# Patient Record
Sex: MALE | Race: WHITE | NOT HISPANIC OR LATINO | URBAN - METROPOLITAN AREA
[De-identification: names, ages, dates, MRNs, and addresses within clinical notes are randomized per-mention and may not be internally consistent; named-entity substitution may affect disease eponyms.]

---

## 2021-04-05 LAB
BILIRUB SERPL-MCNC: NEGATIVE MG/DL
BLOOD URINE, POC: NORMAL
CLARITY, POC UA: CLEAR
COLOR, POC UA: YELLOW
GLUCOSE UR QL STRIP: NORMAL
KETONES UR QL STRIP: NEGATIVE
LEUKOCYTE EST, POC UA: NEGATIVE
NITRITE, POC UA: NEGATIVE
PH, POC UA: 5.5
PROTEIN, POC: NORMAL
SARS-COV-2 RNA RESP QL NAA+PROBE: NEGATIVE
SPECIFIC GRAVITY, POC UA: 1.02
UROBILINOGEN, POC UA: NORMAL

## 2021-05-10 ENCOUNTER — HISTORICAL (OUTPATIENT)
Dept: INTERNAL MEDICINE | Facility: CLINIC | Age: 51
End: 2021-05-10

## 2021-05-10 LAB
ABS NEUT (OLG): 5.72 X10(3)/MCL (ref 2.1–9.2)
ALBUMIN SERPL-MCNC: 3.8 GM/DL (ref 3.5–5)
ALBUMIN/GLOB SERPL: 1 RATIO (ref 1.1–2)
ALP SERPL-CCNC: 73 UNIT/L (ref 40–150)
ALT SERPL-CCNC: 27 UNIT/L (ref 0–55)
APPEARANCE, UA: CLEAR
AST SERPL-CCNC: 20 UNIT/L (ref 5–34)
BACTERIA #/AREA URNS AUTO: ABNORMAL /HPF
BASOPHILS # BLD AUTO: 0 X10(3)/MCL (ref 0–0.2)
BASOPHILS NFR BLD AUTO: 0 %
BILIRUB SERPL-MCNC: 0.9 MG/DL
BILIRUB UR QL STRIP: NEGATIVE
BILIRUBIN DIRECT+TOT PNL SERPL-MCNC: 0.3 MG/DL (ref 0–0.5)
BILIRUBIN DIRECT+TOT PNL SERPL-MCNC: 0.6 MG/DL (ref 0–0.8)
BUN SERPL-MCNC: 11.9 MG/DL (ref 8.4–25.7)
CALCIUM SERPL-MCNC: 9.8 MG/DL (ref 8.4–10.2)
CHLORIDE SERPL-SCNC: 103 MMOL/L (ref 98–107)
CHOLEST SERPL-MCNC: 171 MG/DL
CHOLEST/HDLC SERPL: 4 {RATIO} (ref 0–5)
CO2 SERPL-SCNC: 25 MMOL/L (ref 22–29)
COLOR UR: NORMAL
CREAT SERPL-MCNC: 0.82 MG/DL (ref 0.73–1.18)
CREAT UR-MCNC: 105.5 MG/DL (ref 58–161)
EOSINOPHIL # BLD AUTO: 0.2 X10(3)/MCL (ref 0–0.9)
EOSINOPHIL NFR BLD AUTO: 3 %
ERYTHROCYTE [DISTWIDTH] IN BLOOD BY AUTOMATED COUNT: 13.5 % (ref 11.5–14.5)
EST. AVERAGE GLUCOSE BLD GHB EST-MCNC: 177.2 MG/DL
GLOBULIN SER-MCNC: 3.8 GM/DL (ref 2.4–3.5)
GLUCOSE (UA): NEGATIVE
GLUCOSE SERPL-MCNC: 185 MG/DL (ref 74–100)
HAV IGM SERPL QL IA: NONREACTIVE
HBA1C MFR BLD: 7.8 %
HBV CORE IGM SERPL QL IA: NONREACTIVE
HBV SURFACE AG SERPL QL IA: NONREACTIVE
HCT VFR BLD AUTO: 40.4 % (ref 40–51)
HCV AB SERPL QL IA: NONREACTIVE
HDLC SERPL-MCNC: 45 MG/DL (ref 35–60)
HGB BLD-MCNC: 13.5 GM/DL (ref 13.5–17.5)
HGB UR QL STRIP: NEGATIVE
HIV 1+2 AB+HIV1 P24 AG SERPL QL IA: NONREACTIVE
HYALINE CASTS #/AREA URNS LPF: ABNORMAL /LPF
IMM GRANULOCYTES # BLD AUTO: 0.01 10*3/UL
IMM GRANULOCYTES NFR BLD AUTO: 0 %
KETONES UR QL STRIP: NEGATIVE
LDLC SERPL CALC-MCNC: 90 MG/DL (ref 50–140)
LEUKOCYTE ESTERASE UR QL STRIP: NEGATIVE
LYMPHOCYTES # BLD AUTO: 2 X10(3)/MCL (ref 0.6–4.6)
LYMPHOCYTES NFR BLD AUTO: 22 %
MCH RBC QN AUTO: 30.6 PG (ref 26–34)
MCHC RBC AUTO-ENTMCNC: 33.4 GM/DL (ref 31–37)
MCV RBC AUTO: 91.6 FL (ref 80–100)
MICROALBUMIN UR-MCNC: 68.4 MG/L
MICROALBUMIN/CREAT RATIO PNL UR: 64.8 MG/GM CR (ref 0–30)
MONOCYTES # BLD AUTO: 0.9 X10(3)/MCL (ref 0.1–1.3)
MONOCYTES NFR BLD AUTO: 10 %
NEUTROPHILS # BLD AUTO: 5.72 X10(3)/MCL (ref 2.1–9.2)
NEUTROPHILS NFR BLD AUTO: 65 %
NITRITE UR QL STRIP: NEGATIVE
PH UR STRIP: 5.5 [PH] (ref 4.5–8)
PLATELET # BLD AUTO: 224 X10(3)/MCL (ref 130–400)
PMV BLD AUTO: 11.2 FL (ref 7.4–10.4)
POTASSIUM SERPL-SCNC: 4.2 MMOL/L (ref 3.5–5.1)
PROT SERPL-MCNC: 7.6 GM/DL (ref 6.4–8.3)
PROT UR QL STRIP: NEGATIVE
PSA SERPL-MCNC: 0.62 NG/ML
RBC # BLD AUTO: 4.41 X10(6)/MCL (ref 4.5–5.9)
RBC #/AREA URNS AUTO: ABNORMAL /HPF
SODIUM SERPL-SCNC: 138 MMOL/L (ref 136–145)
SP GR UR STRIP: 1.01 (ref 1–1.03)
SQUAMOUS #/AREA URNS LPF: ABNORMAL /LPF
T PALLIDUM AB SER QL: NONREACTIVE
TRIGL SERPL-MCNC: 181 MG/DL (ref 34–140)
TSH SERPL-ACNC: 1.72 UIU/ML (ref 0.35–4.94)
UROBILINOGEN UR STRIP-ACNC: NORMAL
VLDLC SERPL CALC-MCNC: 36 MG/DL
WBC # SPEC AUTO: 8.8 X10(3)/MCL (ref 4.5–11)
WBC #/AREA URNS AUTO: ABNORMAL /HPF

## 2021-08-13 ENCOUNTER — HISTORICAL (OUTPATIENT)
Dept: INTERNAL MEDICINE | Facility: CLINIC | Age: 51
End: 2021-08-13

## 2021-08-13 LAB
ABS NEUT (OLG): 5.07 X10(3)/MCL (ref 2.1–9.2)
ALBUMIN SERPL-MCNC: 3.8 GM/DL (ref 3.5–5)
ALBUMIN/GLOB SERPL: 1 RATIO (ref 1.1–2)
ALP SERPL-CCNC: 87 UNIT/L (ref 40–150)
ALT SERPL-CCNC: 32 UNIT/L (ref 0–55)
AST SERPL-CCNC: 22 UNIT/L (ref 5–34)
BASOPHILS # BLD AUTO: 0 X10(3)/MCL (ref 0–0.2)
BASOPHILS NFR BLD AUTO: 1 %
BILIRUB SERPL-MCNC: 0.8 MG/DL
BILIRUBIN DIRECT+TOT PNL SERPL-MCNC: 0.2 MG/DL (ref 0–0.5)
BILIRUBIN DIRECT+TOT PNL SERPL-MCNC: 0.6 MG/DL (ref 0–0.8)
BUN SERPL-MCNC: 12.5 MG/DL (ref 8.4–25.7)
CALCIUM SERPL-MCNC: 10.2 MG/DL (ref 8.4–10.2)
CHLORIDE SERPL-SCNC: 103 MMOL/L (ref 98–107)
CO2 SERPL-SCNC: 30 MMOL/L (ref 22–29)
CREAT SERPL-MCNC: 1 MG/DL (ref 0.73–1.18)
EOSINOPHIL # BLD AUTO: 0.2 X10(3)/MCL (ref 0–0.9)
EOSINOPHIL NFR BLD AUTO: 2 %
ERYTHROCYTE [DISTWIDTH] IN BLOOD BY AUTOMATED COUNT: 12.6 % (ref 11.5–14.5)
EST. AVERAGE GLUCOSE BLD GHB EST-MCNC: 208.7 MG/DL
GLOBULIN SER-MCNC: 4 GM/DL (ref 2.4–3.5)
GLUCOSE SERPL-MCNC: 248 MG/DL (ref 74–100)
HBA1C MFR BLD: 8.9 %
HCT VFR BLD AUTO: 42.8 % (ref 40–51)
HGB BLD-MCNC: 14.1 GM/DL (ref 13.5–17.5)
IMM GRANULOCYTES # BLD AUTO: 0.02 10*3/UL
IMM GRANULOCYTES NFR BLD AUTO: 0 %
LYMPHOCYTES # BLD AUTO: 2.4 X10(3)/MCL (ref 0.6–4.6)
LYMPHOCYTES NFR BLD AUTO: 27 %
MCH RBC QN AUTO: 30.2 PG (ref 26–34)
MCHC RBC AUTO-ENTMCNC: 32.9 GM/DL (ref 31–37)
MCV RBC AUTO: 91.6 FL (ref 80–100)
MONOCYTES # BLD AUTO: 1 X10(3)/MCL (ref 0.1–1.3)
MONOCYTES NFR BLD AUTO: 12 %
NEUTROPHILS # BLD AUTO: 5.07 X10(3)/MCL (ref 2.1–9.2)
NEUTROPHILS NFR BLD AUTO: 58 %
NRBC BLD AUTO-RTO: 0 % (ref 0–0.2)
PLATELET # BLD AUTO: 202 X10(3)/MCL (ref 130–400)
PMV BLD AUTO: 11.6 FL (ref 7.4–10.4)
POTASSIUM SERPL-SCNC: 4.8 MMOL/L (ref 3.5–5.1)
PROT SERPL-MCNC: 7.8 GM/DL (ref 6.4–8.3)
RBC # BLD AUTO: 4.67 X10(6)/MCL (ref 4.5–5.9)
SODIUM SERPL-SCNC: 140 MMOL/L (ref 136–145)
WBC # SPEC AUTO: 8.8 X10(3)/MCL (ref 4.5–11)

## 2021-10-22 ENCOUNTER — HISTORICAL (OUTPATIENT)
Dept: SLEEP MEDICINE | Facility: HOSPITAL | Age: 51
End: 2021-10-22

## 2022-04-11 ENCOUNTER — HISTORICAL (OUTPATIENT)
Dept: ADMINISTRATIVE | Facility: HOSPITAL | Age: 52
End: 2022-04-11

## 2022-04-24 VITALS
DIASTOLIC BLOOD PRESSURE: 74 MMHG | BODY MASS INDEX: 42.66 KG/M2 | OXYGEN SATURATION: 99 % | WEIGHT: 315 LBS | HEIGHT: 72 IN | SYSTOLIC BLOOD PRESSURE: 146 MMHG

## 2022-09-22 ENCOUNTER — HISTORICAL (OUTPATIENT)
Dept: ADMINISTRATIVE | Facility: HOSPITAL | Age: 52
End: 2022-09-22

## 2023-02-13 ENCOUNTER — OFFICE VISIT (OUTPATIENT)
Dept: FAMILY MEDICINE | Facility: CLINIC | Age: 53
End: 2023-02-13

## 2023-02-13 VITALS
BODY MASS INDEX: 44.1 KG/M2 | HEART RATE: 64 BPM | RESPIRATION RATE: 18 BRPM | WEIGHT: 315 LBS | OXYGEN SATURATION: 98 % | SYSTOLIC BLOOD PRESSURE: 135 MMHG | DIASTOLIC BLOOD PRESSURE: 82 MMHG | TEMPERATURE: 98 F | HEIGHT: 71 IN

## 2023-02-13 DIAGNOSIS — I10 PRIMARY HYPERTENSION: Chronic | ICD-10-CM

## 2023-02-13 DIAGNOSIS — Z00.00 ENCOUNTER FOR WELLNESS EXAMINATION: Primary | ICD-10-CM

## 2023-02-13 DIAGNOSIS — E11.65 TYPE 2 DIABETES MELLITUS WITH HYPERGLYCEMIA, WITHOUT LONG-TERM CURRENT USE OF INSULIN: ICD-10-CM

## 2023-02-13 DIAGNOSIS — Z12.5 ENCOUNTER FOR SCREENING FOR MALIGNANT NEOPLASM OF PROSTATE: ICD-10-CM

## 2023-02-13 DIAGNOSIS — E78.5 HYPERLIPIDEMIA, UNSPECIFIED HYPERLIPIDEMIA TYPE: ICD-10-CM

## 2023-02-13 DIAGNOSIS — Z12.11 ENCOUNTER FOR SCREENING FOR MALIGNANT NEOPLASM OF COLON: ICD-10-CM

## 2023-02-13 LAB
ALBUMIN SERPL-MCNC: 4 G/DL (ref 3.5–5)
ALBUMIN/GLOB SERPL: 1.1 RATIO (ref 1.1–2)
ALP SERPL-CCNC: 76 UNIT/L (ref 40–150)
ALT SERPL-CCNC: 62 UNIT/L (ref 0–55)
AST SERPL-CCNC: 37 UNIT/L (ref 5–34)
BASOPHILS # BLD AUTO: 0.04 X10(3)/MCL (ref 0–0.2)
BASOPHILS NFR BLD AUTO: 0.5 %
BILIRUBIN DIRECT+TOT PNL SERPL-MCNC: 1.1 MG/DL
BUN SERPL-MCNC: 14.4 MG/DL (ref 8.4–25.7)
CALCIUM SERPL-MCNC: 9.9 MG/DL (ref 8.4–10.2)
CHLORIDE SERPL-SCNC: 105 MMOL/L (ref 98–107)
CHOLEST SERPL-MCNC: 176 MG/DL
CHOLEST/HDLC SERPL: 4 {RATIO} (ref 0–5)
CO2 SERPL-SCNC: 25 MMOL/L (ref 22–29)
CREAT SERPL-MCNC: 0.97 MG/DL (ref 0.73–1.18)
CREAT UR-MCNC: 146.4 MG/DL (ref 63–166)
EOSINOPHIL # BLD AUTO: 0.14 X10(3)/MCL (ref 0–0.9)
EOSINOPHIL NFR BLD AUTO: 1.8 %
ERYTHROCYTE [DISTWIDTH] IN BLOOD BY AUTOMATED COUNT: 12.6 % (ref 11.5–17)
EST. AVERAGE GLUCOSE BLD GHB EST-MCNC: 266.1 MG/DL
GFR SERPLBLD CREATININE-BSD FMLA CKD-EPI: >60 MLS/MIN/1.73/M2
GLOBULIN SER-MCNC: 3.8 GM/DL (ref 2.4–3.5)
GLUCOSE SERPL-MCNC: 184 MG/DL (ref 74–100)
HAV IGM SERPL QL IA: NONREACTIVE
HBA1C MFR BLD: 10.9 %
HBV CORE IGM SERPL QL IA: NONREACTIVE
HBV SURFACE AG SERPL QL IA: NONREACTIVE
HCT VFR BLD AUTO: 45.5 % (ref 42–52)
HCV AB SERPL QL IA: NONREACTIVE
HDLC SERPL-MCNC: 41 MG/DL (ref 35–60)
HGB BLD-MCNC: 15.5 GM/DL (ref 14–18)
HIV 1+2 AB+HIV1 P24 AG SERPL QL IA: NONREACTIVE
IMM GRANULOCYTES # BLD AUTO: 0.02 X10(3)/MCL (ref 0–0.04)
IMM GRANULOCYTES NFR BLD AUTO: 0.3 %
LDLC SERPL CALC-MCNC: 94 MG/DL (ref 50–140)
LYMPHOCYTES # BLD AUTO: 1.77 X10(3)/MCL (ref 0.6–4.6)
LYMPHOCYTES NFR BLD AUTO: 22.7 %
MCH RBC QN AUTO: 30.2 PG
MCHC RBC AUTO-ENTMCNC: 34.1 MG/DL (ref 33–36)
MCV RBC AUTO: 88.5 FL (ref 80–94)
MICROALBUMIN UR-MCNC: 139.6 UG/ML
MICROALBUMIN/CREAT RATIO PNL UR: 95.4 MG/GM CR (ref 0–30)
MONOCYTES # BLD AUTO: 0.78 X10(3)/MCL (ref 0.1–1.3)
MONOCYTES NFR BLD AUTO: 10 %
NEUTROPHILS # BLD AUTO: 5.05 X10(3)/MCL (ref 2.1–9.2)
NEUTROPHILS NFR BLD AUTO: 64.7 %
NRBC BLD AUTO-RTO: 0 %
PLATELET # BLD AUTO: 213 X10(3)/MCL (ref 130–400)
PMV BLD AUTO: 11.1 FL (ref 7.4–10.4)
POTASSIUM SERPL-SCNC: 3.9 MMOL/L (ref 3.5–5.1)
PROT SERPL-MCNC: 7.8 GM/DL (ref 6.4–8.3)
PSA SERPL-MCNC: 0.25 NG/ML
RBC # BLD AUTO: 5.14 X10(6)/MCL (ref 4.7–6.1)
SODIUM SERPL-SCNC: 140 MMOL/L (ref 136–145)
T PALLIDUM AB SER QL: NONREACTIVE
T4 FREE SERPL-MCNC: 1.08 NG/DL (ref 0.7–1.48)
TRIGL SERPL-MCNC: 206 MG/DL (ref 34–140)
TSH SERPL-ACNC: 2.49 UIU/ML (ref 0.35–4.94)
VLDLC SERPL CALC-MCNC: 41 MG/DL
WBC # SPEC AUTO: 7.8 X10(3)/MCL (ref 4.5–11.5)

## 2023-02-13 PROCEDURE — 80053 COMPREHEN METABOLIC PANEL: CPT

## 2023-02-13 PROCEDURE — 83036 HEMOGLOBIN GLYCOSYLATED A1C: CPT

## 2023-02-13 PROCEDURE — 84439 ASSAY OF FREE THYROXINE: CPT

## 2023-02-13 PROCEDURE — 80074 ACUTE HEPATITIS PANEL: CPT

## 2023-02-13 PROCEDURE — 84443 ASSAY THYROID STIM HORMONE: CPT

## 2023-02-13 PROCEDURE — 99386 PR PREVENTIVE VISIT,NEW,40-64: ICD-10-PCS | Mod: S$PBB,,,

## 2023-02-13 PROCEDURE — 86780 TREPONEMA PALLIDUM: CPT

## 2023-02-13 PROCEDURE — 87389 HIV-1 AG W/HIV-1&-2 AB AG IA: CPT

## 2023-02-13 PROCEDURE — 85025 COMPLETE CBC W/AUTO DIFF WBC: CPT

## 2023-02-13 PROCEDURE — 80061 LIPID PANEL: CPT

## 2023-02-13 PROCEDURE — 99386 PREV VISIT NEW AGE 40-64: CPT | Mod: S$PBB,,,

## 2023-02-13 PROCEDURE — 82043 UR ALBUMIN QUANTITATIVE: CPT

## 2023-02-13 PROCEDURE — 99214 OFFICE O/P EST MOD 30 MIN: CPT | Mod: PBBFAC,PN

## 2023-02-13 PROCEDURE — 84153 ASSAY OF PSA TOTAL: CPT

## 2023-02-13 PROCEDURE — 36415 COLL VENOUS BLD VENIPUNCTURE: CPT

## 2023-02-13 RX ORDER — METFORMIN HYDROCHLORIDE 1000 MG/1
1000 TABLET ORAL
COMMUNITY
Start: 2014-06-12 | End: 2023-05-15 | Stop reason: SDUPTHER

## 2023-02-13 RX ORDER — GLIPIZIDE 5 MG/1
5 TABLET ORAL
COMMUNITY
Start: 2023-02-11 | End: 2023-05-15 | Stop reason: SDUPTHER

## 2023-02-13 RX ORDER — LEVOTHYROXINE SODIUM 100 UG/1
1 TABLET ORAL EVERY MORNING
COMMUNITY
Start: 2023-02-11 | End: 2023-05-15 | Stop reason: SDUPTHER

## 2023-02-13 RX ORDER — ALLOPURINOL 100 MG/1
TABLET ORAL
COMMUNITY
End: 2023-05-15 | Stop reason: SDUPTHER

## 2023-02-13 RX ORDER — LOSARTAN POTASSIUM 25 MG/1
25 TABLET ORAL
COMMUNITY
Start: 2023-02-11 | End: 2023-05-15 | Stop reason: SDUPTHER

## 2023-02-13 RX ORDER — LOVASTATIN 10 MG/1
10 TABLET ORAL
COMMUNITY
Start: 2023-02-11 | End: 2023-05-15 | Stop reason: SDUPTHER

## 2023-02-13 NOTE — ASSESSMENT & PLAN NOTE
Low Sodium Diet (Dash Diet - less than 2 grams of sodium per day).  Monitor Blood Pressure daily and log. Report any consistent numbers greater than 140/90.  Smoking Cessation encouraged to aid in BP reduction.  Maintain healthy weight with goal BMI <30. Exercise 30 minutes per day 5 days per week    Chronic controlled, continue losartan 25 mg daily

## 2023-02-13 NOTE — ASSESSMENT & PLAN NOTE
Encouraged ACE/ARB/Statin according to guidelines.  Follow ADA Diet. Avoid soda, simple sweets, and limit rice/pasta/breads/starches.  Maintain healthy weight with goal BMI <30. Exercise 5 times per week for 30 minutes per day.  Stressed importance of daily foot exams.  Stressed importance of annual dilated eye exam.  Last foot exam: 02/13/2023  Last eye exam: next office visit  Last micro albumin: 02/13/2023

## 2023-02-13 NOTE — PROGRESS NOTES
Patient Name: Freeman Louis   : 1970  MRN: [id number]     Subjective:   Patient ID: Freeman Louis is a 52 y.o. male.    Chief Complaint:   Chief Complaint   Patient presents with    Establish Care    Diabetes        HPI: 2023:  Patient presents to clinic today to establish care.  Currently patient does not have any insurance and will not complete all health maintenance that is due up until he is able to start his new job.  Patient states that he should be starting new job as soon as he establishes care here in the clinic.  Patient managed for hypothyroidism, diabetes, hypertension.  Patient unsure of previous thyroid levels but is currently taking 100 mcg of Synthroid each morning.  Patient also taking 1000 mg of metformin, 5 mg of glipizide daily recently went to emergency department in [place] and was told that his A1c was 11.  Patient is amenable to adding medications once he has insurance.  Defer fundus exam, patient states that he is not had his eyes checked in quite some time.  Patient has no other acute complaints today just needing to set up care.        ROS:  Review of Systems   Constitutional:  Negative for chills, fever and weight loss.   HENT:  Negative for ear discharge, nosebleeds and tinnitus.    Eyes:  Negative for blurred vision, photophobia and pain.   Respiratory:  Negative for cough, shortness of breath, wheezing and stridor.    Cardiovascular:  Negative for chest pain, palpitations and orthopnea.   Gastrointestinal:  Negative for abdominal pain, heartburn and nausea.   Genitourinary:  Negative for dysuria, frequency, hematuria and urgency.   Musculoskeletal:  Negative for falls and myalgias.   Skin:  Negative for itching and rash.   Neurological:  Negative for dizziness, sensory change, speech change, focal weakness, seizures, weakness and headaches.   Endo/Heme/Allergies:  Negative for environmental allergies. Does not bruise/bleed easily.   Psychiatric/Behavioral:  Negative for  "hallucinations and suicidal ideas.     History:     Past Medical History:   Diagnosis Date    Diabetes mellitus, type 2     Hypertension       Past Surgical History:   Procedure Laterality Date    APPENDECTOMY       History reviewed. No pertinent family history.   Social History     Tobacco Use    Smoking status: Never    Smokeless tobacco: Never   Substance and Sexual Activity    Alcohol use: Not Currently    Drug use: Not on file    Sexual activity: Not Currently        Allergies: Review of patient's allergies indicates:  No Known Allergies  Objective:     Vitals:    02/13/23 0715   BP: 135/82   Pulse: 64   Resp: 18   Temp: 98.2 °F (36.8 °C)   SpO2: 98%   Weight: (!) 157.1 kg (346 lb 6.4 oz)   Height: 5' 11" (1.803 m)     Body mass index is 48.31 kg/m².     Physical Examination:   Physical Exam  Constitutional:       General: He is not in acute distress.     Appearance: Normal appearance. He is not ill-appearing.   Cardiovascular:      Rate and Rhythm: Normal rate and regular rhythm.      Pulses:           Dorsalis pedis pulses are 3+ on the right side and 3+ on the left side.        Posterior tibial pulses are 3+ on the right side and 3+ on the left side.      Heart sounds: Normal heart sounds.   Pulmonary:      Effort: Pulmonary effort is normal. No respiratory distress.      Breath sounds: Normal breath sounds.   Musculoskeletal:      Cervical back: Normal range of motion.      Right foot: Normal range of motion. No deformity.      Left foot: Normal range of motion. No deformity.   Feet:      Right foot:      Protective Sensation: 10 sites tested.  10 sites sensed.      Skin integrity: Skin integrity normal. No ulcer.      Toenail Condition: Right toenails are normal.      Left foot:      Protective Sensation: 10 sites tested.  10 sites sensed.      Skin integrity: Skin integrity normal. No ulcer.      Toenail Condition: Left toenails are normal.   Skin:     General: Skin is warm and dry.   Neurological:      " Mental Status: He is alert and oriented to person, place, and time.   Psychiatric:         Mood and Affect: Mood normal.         Behavior: Behavior normal.       Assessment:     Problem List Items Addressed This Visit          Cardiac/Vascular    Primary hypertension (Chronic)    Current Assessment & Plan     Low Sodium Diet (Dash Diet - less than 2 grams of sodium per day).  Monitor Blood Pressure daily and log. Report any consistent numbers greater than 140/90.  Smoking Cessation encouraged to aid in BP reduction.  Maintain healthy weight with goal BMI <30. Exercise 30 minutes per day 5 days per week    Chronic controlled, continue losartan 25 mg daily         HLD (hyperlipidemia) (Chronic)    Current Assessment & Plan     Stressed importance of dietary modifications. Follow a low cholesterol, low saturated fat diet with less that 200mg of cholesterol a day.  Avoid fried foods and high saturated fats (high saturated fats less than 7% of calories).  Add Flax Seed/Fish Oil supplements to diet. Increase dietary fiber.  Regular exercise can reduce LDL and raise HDL. Stressed importance of physical activity 5 times per week for 30 minutes per day.       Chronic, FLP today. Continue lovastatin 10mg nightly             Endocrine    Type 2 diabetes mellitus with hyperglycemia (Chronic)    Current Assessment & Plan     Encouraged ACE/ARB/Statin according to guidelines.  Follow ADA Diet. Avoid soda, simple sweets, and limit rice/pasta/breads/starches.  Maintain healthy weight with goal BMI <30. Exercise 5 times per week for 30 minutes per day.  Stressed importance of daily foot exams.  Stressed importance of annual dilated eye exam.  Last foot exam: 02/13/2023  Last eye exam: next office visit  Last micro albumin: 02/13/2023               Relevant Medications    glipiZIDE (GLUCOTROL) 5 MG tablet    metFORMIN (GLUCOPHAGE) 1000 MG tablet    Other Relevant Orders    Hemoglobin A1C    Diabetic Eye Screening Photo     Other  Visit Diagnoses       Encounter for wellness examination    -  Primary    Relevant Orders    TSH    T4, Free    Hemoglobin A1C    SYPHILIS ANTIBODY (WITH REFLEX RPR)    Hepatitis Panel, Acute    Lipid Panel    CBC Auto Differential    Comprehensive Metabolic Panel    HIV 1/2 Ag/Ab (4th Gen)    MICROALBUMIN / CREATININE RATIO URINE    Diabetic Eye Screening Photo    Encounter for screening for malignant neoplasm of colon        Relevant Orders    Cologuard Screening (Multitarget Stool DNA)    Encounter for screening for malignant neoplasm of prostate        Relevant Orders    PSA, Screening            Plan:   Freeman was seen today for establish care and diabetes.    Diagnoses and all orders for this visit:    Encounter for wellness examination  -     TSH  -     T4, Free  -     Hemoglobin A1C  -     SYPHILIS ANTIBODY (WITH REFLEX RPR)  -     Hepatitis Panel, Acute  -     Lipid Panel  -     CBC Auto Differential  -     Comprehensive Metabolic Panel  -     HIV 1/2 Ag/Ab (4th Gen)  -     MICROALBUMIN / CREATININE RATIO URINE  -     Diabetic Eye Screening Photo    Encounter for screening for malignant neoplasm of colon  -     Cologuard Screening (Multitarget Stool DNA); Future  -     Cologuard Screening (Multitarget Stool DNA)    Encounter for screening for malignant neoplasm of prostate  -     PSA, Screening    Type 2 diabetes mellitus with hyperglycemia, without long-term current use of insulin  -     Hemoglobin A1C  -     Diabetic Eye Screening Photo    Hyperlipidemia, unspecified hyperlipidemia type    Primary hypertension        We will call patient with any immediate lab results that needs to be communicated, otherwise patient will follow-up in 3 months or sooner once he obtains insurance.    This note was created with the assistance of Dragon voice recognition software or phone dictation. There may be transcription errors as a result of using this technology however minimal. Effort has been made to assure accuracy  of transcription but any obvious errors or omissions should be clarified with the author of the document

## 2023-02-14 LAB — PATH REV: NORMAL

## 2023-05-09 ENCOUNTER — PATIENT MESSAGE (OUTPATIENT)
Dept: ADMINISTRATIVE | Facility: HOSPITAL | Age: 53
End: 2023-05-09
Payer: COMMERCIAL

## 2023-05-15 ENCOUNTER — OFFICE VISIT (OUTPATIENT)
Dept: FAMILY MEDICINE | Facility: CLINIC | Age: 53
End: 2023-05-15
Payer: COMMERCIAL

## 2023-05-15 VITALS
HEART RATE: 60 BPM | WEIGHT: 315 LBS | TEMPERATURE: 98 F | DIASTOLIC BLOOD PRESSURE: 72 MMHG | SYSTOLIC BLOOD PRESSURE: 130 MMHG | BODY MASS INDEX: 44.1 KG/M2 | OXYGEN SATURATION: 97 % | HEIGHT: 71 IN | RESPIRATION RATE: 16 BRPM

## 2023-05-15 DIAGNOSIS — R74.8 ELEVATED LIVER ENZYMES: Primary | ICD-10-CM

## 2023-05-15 DIAGNOSIS — E03.9 HYPOTHYROIDISM, UNSPECIFIED TYPE: ICD-10-CM

## 2023-05-15 DIAGNOSIS — E11.65 TYPE 2 DIABETES MELLITUS WITH HYPERGLYCEMIA, WITHOUT LONG-TERM CURRENT USE OF INSULIN: ICD-10-CM

## 2023-05-15 DIAGNOSIS — E78.5 HYPERLIPIDEMIA, UNSPECIFIED HYPERLIPIDEMIA TYPE: ICD-10-CM

## 2023-05-15 DIAGNOSIS — I10 PRIMARY HYPERTENSION: ICD-10-CM

## 2023-05-15 LAB
ALBUMIN SERPL-MCNC: 3.9 G/DL (ref 3.5–5)
ALBUMIN/GLOB SERPL: 1 RATIO (ref 1.1–2)
ALP SERPL-CCNC: 75 UNIT/L (ref 40–150)
ALT SERPL-CCNC: 35 UNIT/L (ref 0–55)
AST SERPL-CCNC: 27 UNIT/L (ref 5–34)
BILIRUBIN DIRECT+TOT PNL SERPL-MCNC: 0.7 MG/DL
BUN SERPL-MCNC: 12.5 MG/DL (ref 8.4–25.7)
CALCIUM SERPL-MCNC: 9.8 MG/DL (ref 8.4–10.2)
CHLORIDE SERPL-SCNC: 105 MMOL/L (ref 98–107)
CO2 SERPL-SCNC: 27 MMOL/L (ref 22–29)
CREAT SERPL-MCNC: 0.94 MG/DL (ref 0.73–1.18)
EST. AVERAGE GLUCOSE BLD GHB EST-MCNC: 165.7 MG/DL
GFR SERPLBLD CREATININE-BSD FMLA CKD-EPI: >60 MLS/MIN/1.73/M2
GLOBULIN SER-MCNC: 3.9 GM/DL (ref 2.4–3.5)
GLUCOSE SERPL-MCNC: 159 MG/DL (ref 74–100)
HBA1C MFR BLD: 7.4 %
POTASSIUM SERPL-SCNC: 4.5 MMOL/L (ref 3.5–5.1)
PROT SERPL-MCNC: 7.8 GM/DL (ref 6.4–8.3)
SODIUM SERPL-SCNC: 141 MMOL/L (ref 136–145)

## 2023-05-15 PROCEDURE — 3075F PR MOST RECENT SYSTOLIC BLOOD PRESS GE 130-139MM HG: ICD-10-PCS | Mod: CPTII,,,

## 2023-05-15 PROCEDURE — 3008F PR BODY MASS INDEX (BMI) DOCUMENTED: ICD-10-PCS | Mod: CPTII,,,

## 2023-05-15 PROCEDURE — 3060F POS MICROALBUMINURIA REV: CPT | Mod: CPTII,,,

## 2023-05-15 PROCEDURE — 1160F RVW MEDS BY RX/DR IN RCRD: CPT | Mod: CPTII,,,

## 2023-05-15 PROCEDURE — 83036 HEMOGLOBIN GLYCOSYLATED A1C: CPT

## 2023-05-15 PROCEDURE — 80053 COMPREHEN METABOLIC PANEL: CPT

## 2023-05-15 PROCEDURE — 4010F ACE/ARB THERAPY RXD/TAKEN: CPT | Mod: CPTII,,,

## 2023-05-15 PROCEDURE — 1160F PR REVIEW ALL MEDS BY PRESCRIBER/CLIN PHARMACIST DOCUMENTED: ICD-10-PCS | Mod: CPTII,,,

## 2023-05-15 PROCEDURE — 3075F SYST BP GE 130 - 139MM HG: CPT | Mod: CPTII,,,

## 2023-05-15 PROCEDURE — 3078F DIAST BP <80 MM HG: CPT | Mod: CPTII,,,

## 2023-05-15 PROCEDURE — 99214 PR OFFICE/OUTPT VISIT, EST, LEVL IV, 30-39 MIN: ICD-10-PCS | Mod: S$PBB,,,

## 2023-05-15 PROCEDURE — 3066F NEPHROPATHY DOC TX: CPT | Mod: CPTII,,,

## 2023-05-15 PROCEDURE — 4010F PR ACE/ARB THEARPY RXD/TAKEN: ICD-10-PCS | Mod: CPTII,,,

## 2023-05-15 PROCEDURE — 3078F PR MOST RECENT DIASTOLIC BLOOD PRESSURE < 80 MM HG: ICD-10-PCS | Mod: CPTII,,,

## 2023-05-15 PROCEDURE — 99214 OFFICE O/P EST MOD 30 MIN: CPT | Mod: S$PBB,,,

## 2023-05-15 PROCEDURE — 1159F PR MEDICATION LIST DOCUMENTED IN MEDICAL RECORD: ICD-10-PCS | Mod: CPTII,,,

## 2023-05-15 PROCEDURE — 3066F PR DOCUMENTATION OF TREATMENT FOR NEPHROPATHY: ICD-10-PCS | Mod: CPTII,,,

## 2023-05-15 PROCEDURE — 36415 COLL VENOUS BLD VENIPUNCTURE: CPT

## 2023-05-15 PROCEDURE — 3060F PR POS MICROALBUMINURIA RESULT DOCUMENTED/REVIEW: ICD-10-PCS | Mod: CPTII,,,

## 2023-05-15 PROCEDURE — 1159F MED LIST DOCD IN RCRD: CPT | Mod: CPTII,,,

## 2023-05-15 PROCEDURE — 99214 OFFICE O/P EST MOD 30 MIN: CPT | Mod: PBBFAC,PN

## 2023-05-15 PROCEDURE — 3008F BODY MASS INDEX DOCD: CPT | Mod: CPTII,,,

## 2023-05-15 RX ORDER — ALLOPURINOL 100 MG/1
TABLET ORAL
Qty: 90 TABLET | Refills: 2 | Status: SHIPPED | OUTPATIENT
Start: 2023-05-15 | End: 2023-11-16 | Stop reason: SDUPTHER

## 2023-05-15 RX ORDER — METFORMIN HYDROCHLORIDE 1000 MG/1
1000 TABLET ORAL DAILY
Qty: 90 TABLET | Refills: 0 | Status: SHIPPED | OUTPATIENT
Start: 2023-05-15 | End: 2023-09-15 | Stop reason: SDUPTHER

## 2023-05-15 RX ORDER — LEVOTHYROXINE SODIUM 100 UG/1
100 TABLET ORAL EVERY MORNING
Qty: 90 TABLET | Refills: 1 | Status: SHIPPED | OUTPATIENT
Start: 2023-05-15 | End: 2023-11-16

## 2023-05-15 RX ORDER — SEMAGLUTIDE 0.68 MG/ML
0.5 INJECTION, SOLUTION SUBCUTANEOUS
Qty: 3 ML | Refills: 2 | Status: SHIPPED | OUTPATIENT
Start: 2023-05-15 | End: 2023-05-18

## 2023-05-15 RX ORDER — LOSARTAN POTASSIUM 25 MG/1
25 TABLET ORAL DAILY
Qty: 90 TABLET | Refills: 1 | Status: SHIPPED | OUTPATIENT
Start: 2023-05-15 | End: 2023-08-15 | Stop reason: SDUPTHER

## 2023-05-15 RX ORDER — GLIPIZIDE 5 MG/1
5 TABLET ORAL DAILY
Qty: 90 TABLET | Refills: 1 | Status: SHIPPED | OUTPATIENT
Start: 2023-05-15 | End: 2023-11-16 | Stop reason: SDUPTHER

## 2023-05-15 RX ORDER — LOVASTATIN 10 MG/1
10 TABLET ORAL DAILY
Qty: 90 TABLET | Refills: 0 | Status: SHIPPED | OUTPATIENT
Start: 2023-05-15 | End: 2023-11-16 | Stop reason: SDUPTHER

## 2023-05-15 NOTE — PROGRESS NOTES
Patient Name: Freeman Louis   : 1970  MRN: [id number]     Subjective:   Patient ID: Freeman Louis is a 52 y.o. male.    Chief Complaint:   Chief Complaint   Patient presents with    Follow-up     Pt requests Ozempic.         HPI: 05/15/2023:  Patient presents to clinic today for routine follow-up A1c previously was 10.4 but has come down to 7.4, patient was able to obtain sample of Ozempic from cardiologist, has been taking 0.25 mg q.week in addition to current oral antidiabetic medications.  Patient's A1c has come down to 7.4 with this addition of Ozempic and will be continuing this medication for patient at higher dose of 0.5 mg.  Patient denies any history of thyroid medullary cancer, denies any complications from utilizing this medication.    2023:  Patient presents to clinic today to establish care.  Currently patient does not have any insurance and will not complete all health maintenance that is due up until he is able to start his new job.  Patient states that he should be starting new job as soon as he establishes care here in the clinic.  Patient managed for hypothyroidism, diabetes, hypertension.  Patient unsure of previous thyroid levels but is currently taking 100 mcg of Synthroid each morning.  Patient also taking 1000 mg of metformin, 5 mg of glipizide daily recently went to emergency department in [place] and was told that his A1c was 11.  Patient is amenable to adding medications once he has insurance.  Defer fundus exam, patient states that he is not had his eyes checked in quite some time.  Patient has no other acute complaints today just needing to set up care.        ROS:  Review of Systems   Constitutional:  Negative for chills, fever and weight loss.   HENT:  Negative for ear discharge, nosebleeds and tinnitus.    Eyes:  Negative for blurred vision, photophobia and pain.   Respiratory:  Negative for cough, shortness of breath, wheezing and stridor.    Cardiovascular:  Negative  "for chest pain, palpitations and orthopnea.   Gastrointestinal:  Negative for abdominal pain, heartburn and nausea.   Genitourinary:  Negative for dysuria, frequency, hematuria and urgency.   Musculoskeletal:  Negative for falls and myalgias.   Skin:  Negative for itching and rash.   Neurological:  Negative for dizziness, sensory change, speech change, focal weakness, seizures, weakness and headaches.   Endo/Heme/Allergies:  Negative for environmental allergies. Does not bruise/bleed easily.   Psychiatric/Behavioral:  Negative for hallucinations and suicidal ideas.     History:     Past Medical History:   Diagnosis Date    Diabetes mellitus, type 2     Hypertension       Past Surgical History:   Procedure Laterality Date    APPENDECTOMY       History reviewed. No pertinent family history.   Social History     Tobacco Use    Smoking status: Never    Smokeless tobacco: Never   Substance and Sexual Activity    Alcohol use: Not Currently    Drug use: Not on file    Sexual activity: Not Currently        Allergies: Review of patient's allergies indicates:  No Known Allergies  Objective:     Vitals:    05/15/23 0718 05/15/23 1032   BP: (!) 147/86 130/72   Pulse: 60    Resp: 16    Temp: 98.3 °F (36.8 °C)    SpO2: 97%    Weight: (!) 154.9 kg (341 lb 9.6 oz)    Height: 5' 11" (1.803 m)      Body mass index is 47.64 kg/m².     Physical Examination:   Physical Exam  Constitutional:       General: He is not in acute distress.     Appearance: Normal appearance. He is not ill-appearing.   Cardiovascular:      Rate and Rhythm: Normal rate and regular rhythm.      Heart sounds: Normal heart sounds.   Pulmonary:      Effort: Pulmonary effort is normal. No respiratory distress.      Breath sounds: Normal breath sounds.   Musculoskeletal:      Cervical back: Normal range of motion.   Skin:     General: Skin is warm and dry.   Neurological:      Mental Status: He is alert and oriented to person, place, and time.   Psychiatric:         " Mood and Affect: Mood normal.         Behavior: Behavior normal.       Assessment:     Problem List Items Addressed This Visit          Cardiac/Vascular    Primary hypertension (Chronic)    Current Assessment & Plan     Low Sodium Diet (Dash Diet - less than 2 grams of sodium per day).  Monitor Blood Pressure daily and log. Report any consistent numbers greater than 140/90.  Smoking Cessation encouraged to aid in BP reduction.  Maintain healthy weight with goal BMI <30. Exercise 30 minutes per day 5 days per week        Stable and well controlled. Continue current medication. Limit salt in diet. Monitor BP and notify clinic for consistently elevated BP >140/90.             Relevant Medications    losartan (COZAAR) 25 MG tablet    HLD (hyperlipidemia) (Chronic)    Relevant Medications    lovastatin (MEVACOR) 10 MG tablet       Endocrine    Type 2 diabetes mellitus with hyperglycemia (Chronic)    Current Assessment & Plan     Encouraged ACE/ARB/Statin according to guidelines.  Follow ADA Diet. Avoid soda, simple sweets, and limit rice/pasta/breads/starches.  Maintain healthy weight with goal BMI <30. Exercise 5 times per week for 30 minutes per day.  Stressed importance of daily foot exams.  Stressed importance of annual dilated eye exam.  Last foot exam: 5/15/23  Last eye exam: due when machine fixed  Last micro albumin: 5/15/23             Relevant Medications    metFORMIN (GLUCOPHAGE) 1000 MG tablet    glipiZIDE (GLUCOTROL) 5 MG tablet    semaglutide (OZEMPIC) 0.25 mg or 0.5 mg (2 mg/3 mL) pen injector    Other Relevant Orders    Hemoglobin A1C (Completed)     Other Visit Diagnoses       Elevated liver enzymes    -  Primary    Relevant Orders    Comprehensive Metabolic Panel (Completed)    Hypothyroidism, unspecified type        Relevant Medications    levothyroxine (SYNTHROID) 100 MCG tablet            Plan:   Freeman was seen today for follow-up.    Diagnoses and all orders for this visit:    Elevated liver  enzymes  -     Comprehensive Metabolic Panel    Type 2 diabetes mellitus with hyperglycemia, without long-term current use of insulin  -     Hemoglobin A1C  -     metFORMIN (GLUCOPHAGE) 1000 MG tablet; Take 1 tablet (1,000 mg total) by mouth once daily.  -     glipiZIDE (GLUCOTROL) 5 MG tablet; Take 1 tablet (5 mg total) by mouth once daily.  -     semaglutide (OZEMPIC) 0.25 mg or 0.5 mg (2 mg/3 mL) pen injector; Inject 0.5 mg into the skin every 7 days.    Primary hypertension  -     losartan (COZAAR) 25 MG tablet; Take 1 tablet (25 mg total) by mouth once daily.    Hyperlipidemia, unspecified hyperlipidemia type  -     lovastatin (MEVACOR) 10 MG tablet; Take 1 tablet (10 mg total) by mouth once daily.    Hypothyroidism, unspecified type  -     levothyroxine (SYNTHROID) 100 MCG tablet; Take 1 tablet (100 mcg total) by mouth every morning.    Other orders  -     allopurinoL (ZYLOPRIM) 100 MG tablet; Take one tablet by mouth daily       Follow up in about 3 months (around 8/15/2023), or if symptoms worsen or fail to improve, for DM follow up.     This note was created with the assistance of Dragon voice recognition software or phone dictation. There may be transcription errors as a result of using this technology however minimal. Effort has been made to assure accuracy of transcription but any obvious errors or omissions should be clarified with the author of the document

## 2023-05-15 NOTE — ASSESSMENT & PLAN NOTE
Low Sodium Diet (Dash Diet - less than 2 grams of sodium per day).  Monitor Blood Pressure daily and log. Report any consistent numbers greater than 140/90.  Smoking Cessation encouraged to aid in BP reduction.  Maintain healthy weight with goal BMI <30. Exercise 30 minutes per day 5 days per week        Stable and well controlled. Continue current medication. Limit salt in diet. Monitor BP and notify clinic for consistently elevated BP >140/90.

## 2023-05-15 NOTE — ASSESSMENT & PLAN NOTE
Encouraged ACE/ARB/Statin according to guidelines.  Follow ADA Diet. Avoid soda, simple sweets, and limit rice/pasta/breads/starches.  Maintain healthy weight with goal BMI <30. Exercise 5 times per week for 30 minutes per day.  Stressed importance of daily foot exams.  Stressed importance of annual dilated eye exam.  Last foot exam: 5/15/23  Last eye exam: due when machine fixed  Last micro albumin: 5/15/23

## 2023-05-18 ENCOUNTER — PATIENT MESSAGE (OUTPATIENT)
Dept: ADMINISTRATIVE | Facility: HOSPITAL | Age: 53
End: 2023-05-18
Payer: COMMERCIAL

## 2023-05-18 DIAGNOSIS — E11.65 TYPE 2 DIABETES MELLITUS WITH HYPERGLYCEMIA, WITHOUT LONG-TERM CURRENT USE OF INSULIN: Primary | ICD-10-CM

## 2023-08-15 ENCOUNTER — OFFICE VISIT (OUTPATIENT)
Dept: FAMILY MEDICINE | Facility: CLINIC | Age: 53
End: 2023-08-15
Payer: COMMERCIAL

## 2023-08-15 VITALS
TEMPERATURE: 98 F | WEIGHT: 315 LBS | RESPIRATION RATE: 20 BRPM | HEART RATE: 65 BPM | OXYGEN SATURATION: 96 % | DIASTOLIC BLOOD PRESSURE: 90 MMHG | BODY MASS INDEX: 44.1 KG/M2 | HEIGHT: 71 IN | SYSTOLIC BLOOD PRESSURE: 153 MMHG

## 2023-08-15 DIAGNOSIS — E78.5 HYPERLIPIDEMIA, UNSPECIFIED HYPERLIPIDEMIA TYPE: Chronic | ICD-10-CM

## 2023-08-15 DIAGNOSIS — E11.65 TYPE 2 DIABETES MELLITUS WITH HYPERGLYCEMIA, UNSPECIFIED WHETHER LONG TERM INSULIN USE: Primary | Chronic | ICD-10-CM

## 2023-08-15 DIAGNOSIS — I10 PRIMARY HYPERTENSION: ICD-10-CM

## 2023-08-15 DIAGNOSIS — E11.65 TYPE 2 DIABETES MELLITUS WITH HYPERGLYCEMIA, WITHOUT LONG-TERM CURRENT USE OF INSULIN: ICD-10-CM

## 2023-08-15 LAB — HBA1C MFR BLD: 7.4 %

## 2023-08-15 PROCEDURE — 99214 PR OFFICE/OUTPT VISIT, EST, LEVL IV, 30-39 MIN: ICD-10-PCS | Mod: S$PBB,,,

## 2023-08-15 PROCEDURE — 1159F MED LIST DOCD IN RCRD: CPT | Mod: CPTII,,,

## 2023-08-15 PROCEDURE — 3080F DIAST BP >= 90 MM HG: CPT | Mod: CPTII,,,

## 2023-08-15 PROCEDURE — 3066F NEPHROPATHY DOC TX: CPT | Mod: CPTII,,,

## 2023-08-15 PROCEDURE — 1160F RVW MEDS BY RX/DR IN RCRD: CPT | Mod: CPTII,,,

## 2023-08-15 PROCEDURE — 83036 HEMOGLOBIN GLYCOSYLATED A1C: CPT | Mod: PBBFAC,PN

## 2023-08-15 PROCEDURE — 4010F PR ACE/ARB THEARPY RXD/TAKEN: ICD-10-PCS | Mod: CPTII,,,

## 2023-08-15 PROCEDURE — 4010F ACE/ARB THERAPY RXD/TAKEN: CPT | Mod: CPTII,,,

## 2023-08-15 PROCEDURE — 99214 OFFICE O/P EST MOD 30 MIN: CPT | Mod: S$PBB,,,

## 2023-08-15 PROCEDURE — 1159F PR MEDICATION LIST DOCUMENTED IN MEDICAL RECORD: ICD-10-PCS | Mod: CPTII,,,

## 2023-08-15 PROCEDURE — 3008F PR BODY MASS INDEX (BMI) DOCUMENTED: ICD-10-PCS | Mod: CPTII,,,

## 2023-08-15 PROCEDURE — 3008F BODY MASS INDEX DOCD: CPT | Mod: CPTII,,,

## 2023-08-15 PROCEDURE — 3080F PR MOST RECENT DIASTOLIC BLOOD PRESSURE >= 90 MM HG: ICD-10-PCS | Mod: CPTII,,,

## 2023-08-15 PROCEDURE — 3077F SYST BP >= 140 MM HG: CPT | Mod: CPTII,,,

## 2023-08-15 PROCEDURE — 3060F POS MICROALBUMINURIA REV: CPT | Mod: CPTII,,,

## 2023-08-15 PROCEDURE — 3077F PR MOST RECENT SYSTOLIC BLOOD PRESSURE >= 140 MM HG: ICD-10-PCS | Mod: CPTII,,,

## 2023-08-15 PROCEDURE — 99214 OFFICE O/P EST MOD 30 MIN: CPT | Mod: PBBFAC,PN

## 2023-08-15 PROCEDURE — 1160F PR REVIEW ALL MEDS BY PRESCRIBER/CLIN PHARMACIST DOCUMENTED: ICD-10-PCS | Mod: CPTII,,,

## 2023-08-15 PROCEDURE — 3051F HG A1C>EQUAL 7.0%<8.0%: CPT | Mod: CPTII,,,

## 2023-08-15 PROCEDURE — 3060F PR POS MICROALBUMINURIA RESULT DOCUMENTED/REVIEW: ICD-10-PCS | Mod: CPTII,,,

## 2023-08-15 PROCEDURE — 3066F PR DOCUMENTATION OF TREATMENT FOR NEPHROPATHY: ICD-10-PCS | Mod: CPTII,,,

## 2023-08-15 PROCEDURE — 3051F PR MOST RECENT HEMOGLOBIN A1C LEVEL 7.0 - < 8.0%: ICD-10-PCS | Mod: CPTII,,,

## 2023-08-15 RX ORDER — LOSARTAN POTASSIUM 50 MG/1
50 TABLET ORAL DAILY
Qty: 90 TABLET | Refills: 1 | Status: SHIPPED | OUTPATIENT
Start: 2023-08-15 | End: 2023-11-16 | Stop reason: SDUPTHER

## 2023-08-15 NOTE — ASSESSMENT & PLAN NOTE
Low Sodium Diet (Dash Diet - less than 2 grams of sodium per day).  Monitor Blood Pressure daily and log. Report any consistent numbers greater than 140/90.  Smoking Cessation encouraged to aid in BP reduction.  Maintain healthy weight with goal BMI <30. Exercise 30 minutes per day 5 days per week      Increase losartan to 50 mg

## 2023-08-15 NOTE — PROGRESS NOTES
Patient Name: Freeman Louis     : 1970    MRN: [id number]     Subjective:     Patient ID: Freeman Louis is a 53 y.o. male.    Chief Complaint:   Chief Complaint   Patient presents with    Follow-up     3 month f/u.         HPI: 2023:  Patient presents to clinic today for routine follow-up of diabetes, A1c in clinic today 7.4, patient had to switch from Ozempic to Trulicity due to insurance issues, he has been utilizing 0.75 mg of Trulicity q.week for the past 3 months with stable A1c, patient is slightly better improved A1c control.  will increase patient's Trulicity today. Patient denies chest pain, palpitations, and shortness of breath.  Patient denies fever, night sweats, chills, nausea, vomiting, diarrhea, constipation, weight loss, and changes in appetite.    05/15/2023:  Patient presents to clinic today for routine follow-up A1c previously was 10.4 but has come down to 7.4, patient was able to obtain sample of Ozempic from cardiologist, has been taking 0.25 mg q.week in addition to current oral antidiabetic medications.  Patient's A1c has come down to 7.4 with this addition of Ozempic and will be continuing this medication for patient at higher dose of 0.5 mg.  Patient denies any history of thyroid medullary cancer, denies any complications from utilizing this medication.    2023:  Patient presents to clinic today to establish care.  Currently patient does not have any insurance and will not complete all health maintenance that is due up until he is able to start his new job.  Patient states that he should be starting new job as soon as he establishes care here in the clinic.  Patient managed for hypothyroidism, diabetes, hypertension.  Patient unsure of previous thyroid levels but is currently taking 100 mcg of Synthroid each morning.  Patient also taking 1000 mg of metformin, 5 mg of glipizide daily recently went to emergency department in [place] and was told that his A1c was 11.   "Patient is amenable to adding medications once he has insurance.  Defer fundus exam, patient states that he is not had his eyes checked in quite some time.  Patient has no other acute complaints today just needing to set up care.          ROS:       12 point review of systems conducted, negative except as stated in the history of present illness. See HPI for details.    History:     Past Medical History:   Diagnosis Date    Diabetes mellitus, type 2     Hypertension         Past Surgical History:   Procedure Laterality Date    APPENDECTOMY         History reviewed. No pertinent family history.     Social History     Tobacco Use    Smoking status: Never    Smokeless tobacco: Never   Substance and Sexual Activity    Alcohol use: Not Currently    Drug use: Never    Sexual activity: Not Currently       Current Outpatient Medications   Medication Instructions    allopurinoL (ZYLOPRIM) 100 MG tablet Take one tablet by mouth daily    glipiZIDE (GLUCOTROL) 5 mg, Oral, Daily    levothyroxine (SYNTHROID) 100 mcg, Oral, Every morning    losartan (COZAAR) 50 mg, Oral, Daily    lovastatin (MEVACOR) 10 mg, Oral, Daily    metFORMIN (GLUCOPHAGE) 1,000 mg, Oral, Daily    TRULICITY 1.5 mg, Subcutaneous, Every 7 days        Review of patient's allergies indicates:  No Known Allergies    Objective:     Visit Vitals  BP (!) 153/90 (BP Location: Right arm, Patient Position: Sitting, BP Method: Medium (Manual))   Pulse 65   Temp 97.9 °F (36.6 °C) (Oral)   Resp 20   Ht 5' 11" (1.803 m)   Wt (!) 153.7 kg (338 lb 14.4 oz)   SpO2 96%   BMI 47.27 kg/m²       Physical Examination:     Physical Exam  Constitutional:       General: He is not in acute distress.     Appearance: Normal appearance. He is not ill-appearing.   Cardiovascular:      Rate and Rhythm: Normal rate and regular rhythm.      Heart sounds: Normal heart sounds.   Pulmonary:      Effort: Pulmonary effort is normal. No respiratory distress.      Breath sounds: Normal breath " sounds.   Musculoskeletal:      Cervical back: Normal range of motion.   Skin:     General: Skin is warm and dry.   Neurological:      Mental Status: He is alert and oriented to person, place, and time.   Psychiatric:         Mood and Affect: Mood normal.         Behavior: Behavior normal.         Lab Results:     Chemistry:  Lab Results   Component Value Date     05/15/2023    K 4.5 05/15/2023    CHLORIDE 105 05/15/2023    BUN 12.5 05/15/2023    CREATININE 0.94 05/15/2023    EGFRNORACEVR >60 05/15/2023    GLUCOSE 159 (H) 05/15/2023    CALCIUM 9.8 05/15/2023    ALKPHOS 75 05/15/2023    LABPROT 7.8 05/15/2023    ALBUMIN 3.9 05/15/2023    BILIDIR 0.2 08/13/2021    IBILI 0.60 08/13/2021    AST 27 05/15/2023    ALT 35 05/15/2023    TSH 2.491 02/13/2023    [id number] 1.08 02/13/2023    PSA 0.25 02/13/2023        Lab Results   Component Value Date    HGBA1C 7.4 (H) 05/15/2023        Hematology:  Lab Results   Component Value Date    WBC 7.8 02/13/2023    HGB 15.5 02/13/2023    HCT 45.5 02/13/2023     02/13/2023       Lipid Panel:  Lab Results   Component Value Date    CHOL 176 02/13/2023    HDL 41 02/13/2023    LDL 94.00 02/13/2023    TRIG 206 (H) 02/13/2023    TOTALCHOLEST 4 02/13/2023        Urine:  Lab Results   Component Value Date    APPEARANCEUA Clear 05/10/2021    PROTEINUA Negative 05/10/2021    LEUKOCYTESUR Negative 05/10/2021    RBCUA 0-2 05/10/2021    WBCUA 0-2 05/10/2021    BACTERIA None Seen 05/10/2021    SQEPUA 1-2 05/10/2021    HYALINECASTS 0-2 (A) 05/10/2021    CREATRANDUR 146.4 02/13/2023        Assessment:          ICD-10-CM ICD-9-CM   1. Type 2 diabetes mellitus with hyperglycemia, unspecified whether long term insulin use  E11.65 250.00   2. Primary hypertension  I10 401.9   3. Hyperlipidemia, unspecified hyperlipidemia type  E78.5 272.4   4. Type 2 diabetes mellitus with hyperglycemia, without long-term current use of insulin  E11.65 250.00     790.29        Plan:     1. Type 2 diabetes  mellitus with hyperglycemia, unspecified whether long term insulin use  Assessment & Plan:  Encouraged ACE/ARB/Statin according to guidelines.  Follow ADA Diet. Avoid soda, simple sweets, and limit rice/pasta/breads/starches.  Maintain healthy weight with goal BMI <30. Exercise 5 times per week for 30 minutes per day.  Stressed importance of daily foot exams.  Stressed importance of annual dilated eye exam.  Last foot exam: 5/15/23  Last eye exam: due when machine fixed  Last micro albumin: 5/15/23      POC A1c in clinic today    Orders:  -     POCT HEMOGLOBIN A1C  -     dulaglutide (TRULICITY) 1.5 mg/0.5 mL pen injector; Inject 1.5 mg into the skin every 7 days.  Dispense: 4 pen ; Refill: 5    2. Primary hypertension  Assessment & Plan:  Low Sodium Diet (Dash Diet - less than 2 grams of sodium per day).  Monitor Blood Pressure daily and log. Report any consistent numbers greater than 140/90.  Smoking Cessation encouraged to aid in BP reduction.  Maintain healthy weight with goal BMI <30. Exercise 30 minutes per day 5 days per week      Increase losartan to 50 mg     Orders:  -     losartan (COZAAR) 50 MG tablet; Take 1 tablet (50 mg total) by mouth once daily.  Dispense: 90 tablet; Refill: 1    3. Hyperlipidemia, unspecified hyperlipidemia type  Assessment & Plan:  Stressed importance of dietary modifications. Follow a low cholesterol, low saturated fat diet with less that 200mg of cholesterol a day.  Avoid fried foods and high saturated fats (high saturated fats less than 7% of calories).  Add Flax Seed/Fish Oil supplements to diet. Increase dietary fiber.  Regular exercise can reduce LDL and raise HDL. Stressed importance of physical activity 5 times per week for 30 minutes per day.       Will collect FLP at next office visit      4. Type 2 diabetes mellitus with hyperglycemia, without long-term current use of insulin  Assessment & Plan:  Encouraged ACE/ARB/Statin according to guidelines.  Follow ADA Diet. Avoid  soda, simple sweets, and limit rice/pasta/breads/starches.  Maintain healthy weight with goal BMI <30. Exercise 5 times per week for 30 minutes per day.  Stressed importance of daily foot exams.  Stressed importance of annual dilated eye exam.  Last foot exam: 5/15/23  Last eye exam: due when machine fixed  Last micro albumin: 5/15/23      POC A1c in clinic today           Follow up in about 3 months (around 11/15/2023) for routine labs (A1c, FLP) attempt Fundus if fixed..    Future Appointments   Date Time Provider Department Center   11/16/2023  7:00 AM [facility]        Liza Oconnor NP

## 2023-08-15 NOTE — ASSESSMENT & PLAN NOTE
Stressed importance of dietary modifications. Follow a low cholesterol, low saturated fat diet with less that 200mg of cholesterol a day.  Avoid fried foods and high saturated fats (high saturated fats less than 7% of calories).  Add Flax Seed/Fish Oil supplements to diet. Increase dietary fiber.  Regular exercise can reduce LDL and raise HDL. Stressed importance of physical activity 5 times per week for 30 minutes per day.       Will collect FLP at next office visit

## 2023-08-15 NOTE — ASSESSMENT & PLAN NOTE
Encouraged ACE/ARB/Statin according to guidelines.  Follow ADA Diet. Avoid soda, simple sweets, and limit rice/pasta/breads/starches.  Maintain healthy weight with goal BMI <30. Exercise 5 times per week for 30 minutes per day.  Stressed importance of daily foot exams.  Stressed importance of annual dilated eye exam.  Last foot exam: 5/15/23  Last eye exam: due when machine fixed  Last micro albumin: 5/15/23      POC A1c in clinic today

## 2023-08-16 RX ORDER — DULAGLUTIDE 1.5 MG/.5ML
1.5 INJECTION, SOLUTION SUBCUTANEOUS
Qty: 4 PEN | Refills: 5 | Status: SHIPPED | OUTPATIENT
Start: 2023-08-16 | End: 2023-11-16

## 2023-09-08 ENCOUNTER — TELEPHONE (OUTPATIENT)
Dept: FAMILY MEDICINE | Facility: CLINIC | Age: 53
End: 2023-09-08
Payer: COMMERCIAL

## 2023-09-08 NOTE — TELEPHONE ENCOUNTER
----- Message from Yumi Saleh sent at 9/6/2023  2:07 PM CDT -----  Regarding: Med question  Pts is requesting all prescriptions be sent to [facility] pharmacy in [address] and since the increase in dosage on Trulicity, they are having to pay more for it than before. Pt is wanting to know if they can do a PA or go back to the lower dose.

## 2023-09-15 DIAGNOSIS — E11.65 TYPE 2 DIABETES MELLITUS WITH HYPERGLYCEMIA, WITHOUT LONG-TERM CURRENT USE OF INSULIN: ICD-10-CM

## 2023-09-18 RX ORDER — METFORMIN HYDROCHLORIDE 1000 MG/1
1000 TABLET ORAL DAILY
Qty: 90 TABLET | Refills: 0 | Status: SHIPPED | OUTPATIENT
Start: 2023-09-18 | End: 2023-11-16 | Stop reason: SDUPTHER

## 2023-10-09 ENCOUNTER — PATIENT MESSAGE (OUTPATIENT)
Dept: ADMINISTRATIVE | Facility: HOSPITAL | Age: 53
End: 2023-10-09
Payer: COMMERCIAL

## 2023-10-09 ENCOUNTER — PATIENT OUTREACH (OUTPATIENT)
Dept: ADMINISTRATIVE | Facility: HOSPITAL | Age: 53
End: 2023-10-09
Payer: COMMERCIAL

## 2023-10-09 NOTE — PROGRESS NOTES
Population Health Outreach. Chart Review.   The following below is/are DUE for Health Maintenance.    Colorectal Cancer Screening- Cologuard ordered 2/13/23. Not returned. Pt payor/eligible for colonoscopy. Attempt made to contact patient to inquire if agreeable to colonoscopy. No answer. Message left with name and phone number for callback. Portal message also sent to pt.    Diabetes Eye Exam- ordered 2/13/23    Population Health Chart Review & Patient Outreach Details:     Reason for Outreach Encounter:     [x]  Non-Compliant Report   [x]  Payor Report ([employer], PHN, BCBS, MSSP, MCIP, UHC, etc.)   []  Pre-Visit Chart Review     Updates Requested / Reviewed:     [x]  Care Everywhere    []     []  External Sources ([employer], [employer], [employer], etc.)   [x]  Care Team Updated    Patient Outreach Method:    []  Telephone Outreach Completed   [] Successful   [] Left Voicemail   [] Unable to Contact (wrong number, no voicemail)  [x]  [facility] Portal Outreach Sent  []  Letter Outreach Mailed  []  Fax Sent for External Records  []  External Records Upload    Health Maintenance Topics Addressed and Outreach Outcomes / Actions Taken:        []      Breast Cancer Screening []  Mammo Scheduled      []  External Records Requested     []  Added Reminder to Complete to Upcoming Primary Care Appt Notes     []  Patient Declined     []  Patient Will Call Back to Schedule     []  Patient Will Schedule with External Provider / Order Routed if Applicable             []       Cervical Cancer Screening []  Pap Scheduled      []  External Records Requested     []  Added Reminder to Complete to Upcoming Primary Care Appt Notes     []  Patient Declined     []  Patient Will Call Back to Schedule     []  Patient Will Schedule with External Provider               [x]          Colorectal Cancer Screening []  Colonoscopy Case Request or Referral Placed     []  External Records Requested     []  Added Reminder to Complete to Upcoming Primary  Care Appt Notes     []  Patient Declined     []  Patient Will Call Back to Schedule     []  Patient Will Schedule with External Provider     []  Fit Kit Mailed (add the SmartPhrase under additional notes)     []  Reminded Patient to Complete Home Test             [x]      Diabetic Eye Exam []  Eye Camera Scheduled or Optometry Referral Placed     []  External Records Requested     []  Added Reminder to Complete to Upcoming Primary Care Appt Notes     []  Patient Declined     []  Patient Will Call Back to Schedule     []  Patient Will Schedule with External Provider             []      Blood Pressure Control []  Primary Care Follow Up Visit Scheduled     []  Remote Blood Pressure Reading Captured     []  Added Reminder to Complete to Upcoming Primary Care Appt Notes     []  Patient Declined     []  Patient Will Call Back / Patient Will Send Portal Message with Reading     []  Patient Will Call Back to Schedule Provider Visit             []       HbA1c & Other Labs []  Lab Appt Scheduled for Due Labs     []  Primary Care Follow Up Visit Scheduled      []  Reminded Patient to Complete Home Test     []  Added Reminder to Complete to Upcoming Primary Care Appt Notes     []  Patient Declined     []  Patient Will Call Back to Schedule     []  Patient Will Schedule with External Provider / Order Routed if Applicable           []    Schedule Primary Care Appt []  Primary Care Appt Scheduled     []  Patient Declined     []  Patient Will Call Back to Schedule     []  Pt Established with External Provider & Updated Care Team             []      Medication Adherence []  Primary Care Appointment Scheduled     []  Added Reminder to Upcoming Primary Care Appt Notes     []  Patient Reminded to  Prescription     []  Patient Declined, Provider Notified if Needed     []  Sent Provider Message to Review and/or Add Exclusion to Problem List             []      Osteoporosis Screening []  DXA Appointment Scheduled     []  External  Records Requested     []  Added Reminder to Complete to Upcoming Primary Care Appt Notes     []  Patient Declined     []  Patient Will Call Back to Schedule     []  Patient Will Schedule with External Provider / Order Routed if Applicable     Additional Care Coordinator Notes:         Further Action Needed If Patient Returns Outreach:

## 2023-11-08 ENCOUNTER — PATIENT OUTREACH (OUTPATIENT)
Dept: ADMINISTRATIVE | Facility: HOSPITAL | Age: 53
End: 2023-11-08
Payer: COMMERCIAL

## 2023-11-08 NOTE — PROGRESS NOTES
BP Outreach. Pt BP elevated at last appointment. No ans. VM msg left. Awaiting pt reply with remote BP.

## 2023-11-09 ENCOUNTER — PATIENT MESSAGE (OUTPATIENT)
Dept: ADMINISTRATIVE | Facility: HOSPITAL | Age: 53
End: 2023-11-09
Payer: COMMERCIAL

## 2023-11-16 ENCOUNTER — OFFICE VISIT (OUTPATIENT)
Dept: FAMILY MEDICINE | Facility: CLINIC | Age: 53
End: 2023-11-16
Payer: COMMERCIAL

## 2023-11-16 ENCOUNTER — CLINICAL SUPPORT (OUTPATIENT)
Dept: FAMILY MEDICINE | Facility: CLINIC | Age: 53
End: 2023-11-16
Payer: COMMERCIAL

## 2023-11-16 VITALS
OXYGEN SATURATION: 97 % | RESPIRATION RATE: 20 BRPM | TEMPERATURE: 98 F | HEIGHT: 71 IN | BODY MASS INDEX: 44.1 KG/M2 | WEIGHT: 315 LBS | DIASTOLIC BLOOD PRESSURE: 82 MMHG | HEART RATE: 63 BPM | SYSTOLIC BLOOD PRESSURE: 136 MMHG

## 2023-11-16 DIAGNOSIS — E03.9 HYPOTHYROIDISM, UNSPECIFIED TYPE: ICD-10-CM

## 2023-11-16 DIAGNOSIS — G47.33 OSA (OBSTRUCTIVE SLEEP APNEA): ICD-10-CM

## 2023-11-16 DIAGNOSIS — Z87.39 HISTORY OF GOUT: ICD-10-CM

## 2023-11-16 DIAGNOSIS — E11.65 TYPE 2 DIABETES MELLITUS WITH HYPERGLYCEMIA, UNSPECIFIED WHETHER LONG TERM INSULIN USE: Primary | ICD-10-CM

## 2023-11-16 DIAGNOSIS — E11.65 TYPE 2 DIABETES MELLITUS WITH HYPERGLYCEMIA, WITHOUT LONG-TERM CURRENT USE OF INSULIN: ICD-10-CM

## 2023-11-16 DIAGNOSIS — J32.9 CHRONIC CONGESTION OF PARANASAL SINUS: ICD-10-CM

## 2023-11-16 DIAGNOSIS — E78.5 HYPERLIPIDEMIA, UNSPECIFIED HYPERLIPIDEMIA TYPE: ICD-10-CM

## 2023-11-16 DIAGNOSIS — I10 PRIMARY HYPERTENSION: ICD-10-CM

## 2023-11-16 LAB
HBA1C MFR BLD: 8.5 %
TSH SERPL-ACNC: 2.71 UIU/ML (ref 0.35–4.94)
URATE SERPL-MCNC: 7.3 MG/DL (ref 3.5–7.2)

## 2023-11-16 PROCEDURE — 84550 ASSAY OF BLOOD/URIC ACID: CPT

## 2023-11-16 PROCEDURE — 3066F NEPHROPATHY DOC TX: CPT | Mod: CPTII,,,

## 2023-11-16 PROCEDURE — 3075F SYST BP GE 130 - 139MM HG: CPT | Mod: CPTII,,,

## 2023-11-16 PROCEDURE — 3060F PR POS MICROALBUMINURIA RESULT DOCUMENTED/REVIEW: ICD-10-PCS | Mod: CPTII,,,

## 2023-11-16 PROCEDURE — 3079F PR MOST RECENT DIASTOLIC BLOOD PRESSURE 80-89 MM HG: ICD-10-PCS | Mod: CPTII,,,

## 2023-11-16 PROCEDURE — 1159F PR MEDICATION LIST DOCUMENTED IN MEDICAL RECORD: ICD-10-PCS | Mod: CPTII,,,

## 2023-11-16 PROCEDURE — 99214 PR OFFICE/OUTPT VISIT, EST, LEVL IV, 30-39 MIN: ICD-10-PCS | Mod: S$PBB,,,

## 2023-11-16 PROCEDURE — 84443 ASSAY THYROID STIM HORMONE: CPT

## 2023-11-16 PROCEDURE — 99214 OFFICE O/P EST MOD 30 MIN: CPT | Mod: S$PBB,,,

## 2023-11-16 PROCEDURE — 3079F DIAST BP 80-89 MM HG: CPT | Mod: CPTII,,,

## 2023-11-16 PROCEDURE — 92228 IMG RTA DETC/MNTR DS PHY/QHP: CPT | Mod: TC,PBBFAC,PN

## 2023-11-16 PROCEDURE — 3008F BODY MASS INDEX DOCD: CPT | Mod: CPTII,,,

## 2023-11-16 PROCEDURE — 3008F PR BODY MASS INDEX (BMI) DOCUMENTED: ICD-10-PCS | Mod: CPTII,,,

## 2023-11-16 PROCEDURE — 4010F PR ACE/ARB THEARPY RXD/TAKEN: ICD-10-PCS | Mod: CPTII,,,

## 2023-11-16 PROCEDURE — 1159F MED LIST DOCD IN RCRD: CPT | Mod: CPTII,,,

## 2023-11-16 PROCEDURE — 36415 COLL VENOUS BLD VENIPUNCTURE: CPT

## 2023-11-16 PROCEDURE — 3066F PR DOCUMENTATION OF TREATMENT FOR NEPHROPATHY: ICD-10-PCS | Mod: CPTII,,,

## 2023-11-16 PROCEDURE — 1160F PR REVIEW ALL MEDS BY PRESCRIBER/CLIN PHARMACIST DOCUMENTED: ICD-10-PCS | Mod: CPTII,,,

## 2023-11-16 PROCEDURE — 3060F POS MICROALBUMINURIA REV: CPT | Mod: CPTII,,,

## 2023-11-16 PROCEDURE — 99214 OFFICE O/P EST MOD 30 MIN: CPT | Mod: PBBFAC,PN

## 2023-11-16 PROCEDURE — 1160F RVW MEDS BY RX/DR IN RCRD: CPT | Mod: CPTII,,,

## 2023-11-16 PROCEDURE — 3075F PR MOST RECENT SYSTOLIC BLOOD PRESS GE 130-139MM HG: ICD-10-PCS | Mod: CPTII,,,

## 2023-11-16 PROCEDURE — 3052F PR MOST RECENT HEMOGLOBIN A1C LEVEL 8.0 - < 9.0%: ICD-10-PCS | Mod: CPTII,,,

## 2023-11-16 PROCEDURE — 4010F ACE/ARB THERAPY RXD/TAKEN: CPT | Mod: CPTII,,,

## 2023-11-16 PROCEDURE — 83036 HEMOGLOBIN GLYCOSYLATED A1C: CPT | Mod: PBBFAC,PN

## 2023-11-16 PROCEDURE — 3052F HG A1C>EQUAL 8.0%<EQUAL 9.0%: CPT | Mod: CPTII,,,

## 2023-11-16 RX ORDER — LOVASTATIN 10 MG/1
10 TABLET ORAL DAILY
Qty: 90 TABLET | Refills: 0 | Status: SHIPPED | OUTPATIENT
Start: 2023-11-16 | End: 2024-02-14

## 2023-11-16 RX ORDER — METFORMIN HYDROCHLORIDE 1000 MG/1
1000 TABLET ORAL DAILY
Qty: 90 EACH | Refills: 0 | Status: SHIPPED | OUTPATIENT
Start: 2023-11-16 | End: 2024-02-14

## 2023-11-16 RX ORDER — GLIPIZIDE 5 MG/1
5 TABLET ORAL DAILY
Qty: 90 TABLET | Refills: 1 | Status: SHIPPED | OUTPATIENT
Start: 2023-11-16 | End: 2024-05-14

## 2023-11-16 RX ORDER — LOSARTAN POTASSIUM 100 MG/1
100 TABLET ORAL DAILY
Qty: 90 TABLET | Refills: 1 | Status: SHIPPED | OUTPATIENT
Start: 2023-11-16 | End: 2024-05-14

## 2023-11-16 RX ORDER — DULAGLUTIDE 3 MG/.5ML
3 INJECTION, SOLUTION SUBCUTANEOUS
Qty: 4 PEN | Refills: 5 | Status: SHIPPED | OUTPATIENT
Start: 2023-11-16 | End: 2024-01-17

## 2023-11-16 RX ORDER — ALLOPURINOL 100 MG/1
TABLET ORAL
Qty: 90 TABLET | Refills: 2 | Status: SHIPPED | OUTPATIENT
Start: 2023-11-16

## 2023-11-16 NOTE — ASSESSMENT & PLAN NOTE
Stressed importance of dietary modifications. Follow a low cholesterol, low saturated fat diet with less that 200mg of cholesterol a day.  Avoid fried foods and high saturated fats (high saturated fats less than 7% of calories).  Add Flax Seed/Fish Oil supplements to diet. Increase dietary fiber.  Regular exercise can reduce LDL and raise HDL. Stressed importance of physical activity 5 times per week for 30 minutes per day.       FLP today, continue lovastatin 10 mg

## 2023-11-16 NOTE — ASSESSMENT & PLAN NOTE
Referral to assess him for at home sleep study and titration.  Patient will likely need an evaluation and treatment since he has had machine for over 10 years.

## 2023-11-16 NOTE — PROGRESS NOTES
Patient Name: Freeman Louis     : 1970    MRN: [id number]     Subjective:     Patient ID: Freeman Louis is a 53 y.o. male.    Chief Complaint:   Chief Complaint   Patient presents with    Follow-up     3 month f/u. Requesting referral for ENT. States needs a new bi pap machine.         HPI: 2023:  Patient presents to clinic today for routine follow-up of diabetes, hypertension, hyperlipidemia.  Today is also requesting a an evaluation for his sleep apnea.  States that he was diagnosed with obstructive sleep apnea that was centralized about 10 years ago.  He has been on BiPAP for management of the symptoms with fullface mask since then.  Patient states he is unable to sleep without his machine.  Further states that he needs new parts and is aware that he will likely need a new evaluation and treatment.  He is amenable for referral to be placed today.  He is also asking for referral to ENT, states that he is suffered with chronic sinus congestion for many years, previous patient of Dr. Lex Thompson but does not believe that this provider is practicing anymore.  He would like referral to new ENT in order to establish care and have further assessment.  States that he uses over-the-counter medications for allergies but was told he may need a surgery for his sinuses previously.  Patient A1c has increased, attempted to increase his Trulicity but due to insurance patient had to remain at the 1.5 mg per week injection, he would like to increase medication now that he has new insurance.  Patient denies chest pain, palpitations, and shortness of breath.  Patient denies fever, night sweats, chills, nausea, vomiting, diarrhea, constipation, weight loss, and changes in appetite.            ROS:       12 point review of systems conducted, negative except as stated in the history of present illness. See HPI for details.    History:     Past Medical History:   Diagnosis Date    Diabetes mellitus, type 2      "Hypertension         Past Surgical History:   Procedure Laterality Date    APPENDECTOMY         History reviewed. No pertinent family history.     Social History     Tobacco Use    Smoking status: Never    Smokeless tobacco: Never   Substance and Sexual Activity    Alcohol use: Not Currently    Drug use: Never    Sexual activity: Not Currently       Current Outpatient Medications   Medication Instructions    allopurinoL (ZYLOPRIM) 100 MG tablet Take one tablet by mouth daily    glipiZIDE (GLUCOTROL) 5 mg, Oral, Daily    levothyroxine (SYNTHROID) 100 mcg, Oral, Every morning    losartan (COZAAR) 100 mg, Oral, Daily    lovastatin (MEVACOR) 10 mg, Oral, Daily    metFORMIN (GLUCOPHAGE) 1,000 mg, Oral, Daily    TRULICITY 3 mg, Subcutaneous, Every 7 days        Review of patient's allergies indicates:  No Known Allergies    Objective:     Visit Vitals  /82 (BP Location: Right arm, Patient Position: Sitting)   Pulse 63   Temp 97.9 °F (36.6 °C) (Oral)   Resp 20   Ht 5' 11" (1.803 m)   Wt (!) 157.9 kg (348 lb)   SpO2 97%   BMI 48.54 kg/m²       Physical Examination:     Physical Exam  Constitutional:       General: He is not in acute distress.     Appearance: Normal appearance. He is not ill-appearing.   Cardiovascular:      Rate and Rhythm: Normal rate and regular rhythm.      Heart sounds: Normal heart sounds.   Pulmonary:      Effort: Pulmonary effort is normal. No respiratory distress.      Breath sounds: Normal breath sounds.   Musculoskeletal:      Cervical back: Normal range of motion.   Skin:     General: Skin is warm and dry.   Neurological:      Mental Status: He is alert and oriented to person, place, and time.   Psychiatric:         Mood and Affect: Mood normal.         Behavior: Behavior normal.         Lab Results:     Chemistry:  Lab Results   Component Value Date     05/15/2023    K 4.5 05/15/2023    CHLORIDE 105 05/15/2023    BUN 12.5 05/15/2023    CREATININE 0.94 05/15/2023    EGFRNORACEVR >60 " 05/15/2023    GLUCOSE 159 (H) 05/15/2023    CALCIUM 9.8 05/15/2023    ALKPHOS 75 05/15/2023    LABPROT 7.8 05/15/2023    ALBUMIN 3.9 05/15/2023    BILIDIR 0.2 08/13/2021    IBILI 0.60 08/13/2021    AST 27 05/15/2023    ALT 35 05/15/2023    TSH 2.491 02/13/2023    [id number] 1.08 02/13/2023    PSA 0.25 02/13/2023        Lab Results   Component Value Date    HGBA1C 7.4 (H) 05/15/2023        Hematology:  Lab Results   Component Value Date    WBC 7.8 02/13/2023    HGB 15.5 02/13/2023    HCT 45.5 02/13/2023     02/13/2023       Lipid Panel:  Lab Results   Component Value Date    CHOL 176 02/13/2023    HDL 41 02/13/2023    LDL 94.00 02/13/2023    TRIG 206 (H) 02/13/2023    TOTALCHOLEST 4 02/13/2023        Urine:  Lab Results   Component Value Date    APPEARANCEUA Clear 05/10/2021    PROTEINUA Negative 05/10/2021    LEUKOCYTESUR Negative 05/10/2021    RBCUA 0-2 05/10/2021    WBCUA 0-2 05/10/2021    BACTERIA None Seen 05/10/2021    SQEPUA 1-2 05/10/2021    HYALINECASTS 0-2 (A) 05/10/2021    CREATRANDUR 146.4 02/13/2023        Assessment:          ICD-10-CM ICD-9-CM   1. Type 2 diabetes mellitus with hyperglycemia, unspecified whether long term insulin use  E11.65 250.00   2. Primary hypertension  I10 401.9   3. Hypothyroidism, unspecified type  E03.9 244.9   4. Type 2 diabetes mellitus with hyperglycemia, without long-term current use of insulin  E11.65 250.00     790.29   5. Hyperlipidemia, unspecified hyperlipidemia type  E78.5 272.4   6. History of gout  Z87.39 V12.29   7. Chronic congestion of paranasal sinus  J32.9 473.9   8. NINFA (obstructive sleep apnea)  G47.33 327.23        Plan:     1. Type 2 diabetes mellitus with hyperglycemia, unspecified whether long term insulin use  Overview:  Encouraged ACE/ARB/Statin according to guidelines.  Follow ADA Diet. Avoid soda, simple sweets, and limit rice/pasta/breads/starches.  Maintain healthy weight with goal BMI <30. Exercise 5 times per week for 30 minutes per  day.  Stressed importance of daily foot exams.  Stressed importance of annual dilated eye exam.  Last foot exam: 2/13/23  Last eye exam: 11/16/2023  Last micro albumin: 2/13/23      Orders:  -     POCT HEMOGLOBIN A1C  -     Diabetic Eye Screening Photo    2. Primary hypertension  -     losartan (COZAAR) 100 MG tablet; Take 1 tablet (100 mg total) by mouth once daily.  Dispense: 90 tablet; Refill: 1    3. Hypothyroidism, unspecified type  -     TSH    4. Type 2 diabetes mellitus with hyperglycemia, without long-term current use of insulin  Overview:  Encouraged ACE/ARB/Statin according to guidelines.  Follow ADA Diet. Avoid soda, simple sweets, and limit rice/pasta/breads/starches.  Maintain healthy weight with goal BMI <30. Exercise 5 times per week for 30 minutes per day.  Stressed importance of daily foot exams.  Stressed importance of annual dilated eye exam.  Last foot exam: 2/13/23  Last eye exam: 11/16/2023  Last micro albumin: 2/13/23      Orders:  -     metFORMIN (GLUCOPHAGE) 1000 MG tablet; Take 1 tablet (1,000 mg total) by mouth once daily.  Dispense: 90 each; Refill: 0  -     glipiZIDE (GLUCOTROL) 5 MG tablet; Take 1 tablet (5 mg total) by mouth once daily.  Dispense: 90 tablet; Refill: 1  -     dulaglutide (TRULICITY) 3 mg/0.5 mL pen injector; Inject 3 mg into the skin every 7 days.  Dispense: 4 pen ; Refill: 5    5. Hyperlipidemia, unspecified hyperlipidemia type  Assessment & Plan:  Stressed importance of dietary modifications. Follow a low cholesterol, low saturated fat diet with less that 200mg of cholesterol a day.  Avoid fried foods and high saturated fats (high saturated fats less than 7% of calories).  Add Flax Seed/Fish Oil supplements to diet. Increase dietary fiber.  Regular exercise can reduce LDL and raise HDL. Stressed importance of physical activity 5 times per week for 30 minutes per day.       FLP today, continue lovastatin 10 mg      Orders:  -     lovastatin (MEVACOR) 10 MG tablet;  Take 1 tablet (10 mg total) by mouth once daily.  Dispense: 90 tablet; Refill: 0    6. History of gout  -     Uric Acid  -     allopurinoL (ZYLOPRIM) 100 MG tablet; Take one tablet by mouth daily  Dispense: 90 tablet; Refill: 2    7. Chronic congestion of paranasal sinus  -     Ambulatory referral/consult to ENT; Future; Expected date: 11/16/2023    8. NINFA (obstructive sleep apnea)  Assessment & Plan:  Referral to assess him for at home sleep study and titration.  Patient will likely need an evaluation and treatment since he has had machine for over 10 years.             Future Appointments   Date Time Provider Department Center   2/16/2024  8:00 AM Liza Oconnor NP [facility]        Liza Oconnor NP

## 2023-11-20 NOTE — PROGRESS NOTES
Freeman Louis is a 53 y.o. male here for a diabetic eye screening with non-dilated fundus photos per Liza Oconnor NP.    Patient cooperative?: Yes  Small pupils?: NO  Last eye exam:       For exam results, see Encounter Report.

## 2023-12-10 ENCOUNTER — HOSPITAL ENCOUNTER (EMERGENCY)
Facility: HOSPITAL | Age: 53
Discharge: HOME | End: 2023-12-10
Attending: EMERGENCY MEDICINE
Payer: COMMERCIAL

## 2023-12-10 VITALS
OXYGEN SATURATION: 95 % | RESPIRATION RATE: 16 BRPM | TEMPERATURE: 99 F | BODY MASS INDEX: 44.1 KG/M2 | DIASTOLIC BLOOD PRESSURE: 68 MMHG | SYSTOLIC BLOOD PRESSURE: 128 MMHG | WEIGHT: 315 LBS | HEIGHT: 71 IN | HEART RATE: 76 BPM

## 2023-12-10 DIAGNOSIS — J10.1 INFLUENZA A: Primary | ICD-10-CM

## 2023-12-10 LAB
FLUAV AG UPPER RESP QL IA.RAPID: DETECTED
FLUBV AG UPPER RESP QL IA.RAPID: NOT DETECTED
SARS-COV-2 RNA RESP QL NAA+PROBE: NOT DETECTED
STREP A PCR (OHS): NOT DETECTED

## 2023-12-10 PROCEDURE — 99283 EMERGENCY DEPT VISIT LOW MDM: CPT

## 2023-12-10 PROCEDURE — 0240U COVID/FLU A&B PCR: CPT | Performed by: EMERGENCY MEDICINE

## 2023-12-10 PROCEDURE — 87651 STREP A DNA AMP PROBE: CPT | Performed by: EMERGENCY MEDICINE

## 2023-12-10 RX ORDER — OSELTAMIVIR PHOSPHATE 75 MG/1
75 CAPSULE ORAL 2 TIMES DAILY
Qty: 10 CAPSULE | Refills: 0 | Status: SHIPPED | OUTPATIENT
Start: 2023-12-10 | End: 2023-12-15

## 2023-12-10 NOTE — ED PROVIDER NOTES
Encounter Date: 12/10/2023       History     Chief Complaint   Patient presents with    Fatigue     Pt reports fever, throat irritation, and fatigue over the past 2 days; also reports a dry cough; denies any bodyaches/pain     This 53-year-old man presents with complaints of fever, sore throat, cough, fatigue, body aches, and decreased appetite for the past 2 days.       Review of patient's allergies indicates:  No Known Allergies  Past Medical History:   Diagnosis Date    Diabetes mellitus, type 2     Hypertension      Past Surgical History:   Procedure Laterality Date    APPENDECTOMY       No family history on file.  Social History     Tobacco Use    Smoking status: Never    Smokeless tobacco: Never   Substance Use Topics    Alcohol use: Not Currently    Drug use: Never     Review of Systems   Constitutional:  Positive for fatigue and fever.   HENT:  Positive for sore throat.    Respiratory:  Positive for cough. Negative for shortness of breath.    Cardiovascular:  Negative for chest pain.   Gastrointestinal:  Negative for nausea.   Genitourinary:  Negative for dysuria.   Musculoskeletal:  Negative for back pain.   Skin:  Negative for rash.   Neurological:  Positive for weakness.   Hematological:  Does not bruise/bleed easily.       Physical Exam     Initial Vitals [12/10/23 0846]   BP Pulse Resp Temp SpO2   128/68 76 16 98.8 °F (37.1 °C) 95 %      MAP       --         Physical Exam    Nursing note and vitals reviewed.  Constitutional: He appears well-developed and well-nourished.   HENT:   Head: Normocephalic and atraumatic.   Mouth/Throat: Mucous membranes are normal.   Eyes: EOM are normal. Pupils are equal, round, and reactive to light.   Neck: Neck supple.   Normal range of motion.  Cardiovascular:  Normal rate, regular rhythm, normal heart sounds and intact distal pulses.           Pulmonary/Chest: Breath sounds normal.   Abdominal: Abdomen is soft. Bowel sounds are normal.   Musculoskeletal:          General: Normal range of motion.      Cervical back: Normal range of motion and neck supple.     Neurological: He is alert and oriented to person, place, and time. He has normal strength.   Skin: Skin is warm and dry. Capillary refill takes less than 2 seconds.   Psychiatric: He has a normal mood and affect. His behavior is normal. Judgment and thought content normal.         ED Course   Procedures  Labs Reviewed   COVID/FLU A&B PCR - Abnormal; Notable for the following components:       Result Value    Influenza A PCR Detected (*)     All other components within normal limits    Narrative:     The Xpert Xpress SARS-CoV-2/FLU/RSV plus is a rapid, multiplexed real-time PCR test intended for the simultaneous qualitative detection and differentiation of SARS-CoV-2, Influenza A, Influenza B, and respiratory syncytial virus (RSV) viral RNA in either nasopharyngeal swab or nasal swab specimens.         STREP GROUP A BY PCR - Normal    Narrative:     The Xpert Xpress Strep A test is a rapid, qualitative in vitro diagnostic test for the detection of Streptococcus pyogenes (Group A ß-hemolytic Streptococcus, Strep A) in throat swab specimens from patients with signs and symptoms of pharyngitis.            Imaging Results    None          Medications - No data to display  Medical Decision Making  Amount and/or Complexity of Data Reviewed  Labs: ordered.    Risk  Prescription drug management.                                      Clinical Impression:  Final diagnoses:  [J10.1] Influenza A (Primary)          ED Disposition Condition    Discharge Stable          ED Prescriptions       Medication Sig Dispense Start Date End Date Auth. Provider    oseltamivir (TAMIFLU) 75 MG capsule Take 1 capsule (75 mg total) by mouth 2 (two) times daily. for 5 days 10 capsule 12/10/2023 12/15/2023 Wei Cruz MD          Follow-up Information       Follow up With Specialties Details Why Contact Info    Liza Oconnor NP Family  Medicine Schedule an appointment as soon as possible for a visit   [facility] [facility] [address]  [phone]               Wei Cruz MD  12/10/23 1017

## 2023-12-10 NOTE — Clinical Note
"Freeman Anguiano" Missy was seen and treated in our emergency department on 12/10/2023.  He may return to work on 12/14/2023.       If you have any questions or concerns, please don't hesitate to call.      Wei Cruz MD"

## 2024-01-17 ENCOUNTER — TELEPHONE (OUTPATIENT)
Dept: FAMILY MEDICINE | Facility: CLINIC | Age: 54
End: 2024-01-17
Payer: COMMERCIAL

## 2024-01-17 DIAGNOSIS — E11.65 TYPE 2 DIABETES MELLITUS WITH HYPERGLYCEMIA, UNSPECIFIED WHETHER LONG TERM INSULIN USE: Primary | ICD-10-CM

## 2024-01-17 RX ORDER — SEMAGLUTIDE 1.34 MG/ML
1 INJECTION, SOLUTION SUBCUTANEOUS
Qty: 3 ML | Refills: 2 | Status: SHIPPED | OUTPATIENT
Start: 2024-01-17 | End: 2024-04-11 | Stop reason: SDUPTHER

## 2024-01-17 NOTE — TELEPHONE ENCOUNTER
----- Message from Josee Levy LPN sent at 1/17/2024  9:32 AM CST -----  Regarding: FW: Ozempic    ----- Message -----  From: Yumi Saleh  Sent: 1/17/2024   9:29 AM CST  To: [facility]  Subject: Ozempic                                          Pt called, wanting to switch to Ozempic from trulicity due to insurance.

## 2024-02-02 ENCOUNTER — PATIENT MESSAGE (OUTPATIENT)
Dept: ADMINISTRATIVE | Facility: HOSPITAL | Age: 54
End: 2024-02-02
Payer: COMMERCIAL

## 2024-02-12 ENCOUNTER — TELEPHONE (OUTPATIENT)
Dept: FAMILY MEDICINE | Facility: CLINIC | Age: 54
End: 2024-02-12
Payer: COMMERCIAL

## 2024-02-12 ENCOUNTER — PATIENT OUTREACH (OUTPATIENT)
Dept: ADMINISTRATIVE | Facility: HOSPITAL | Age: 54
End: 2024-02-12
Payer: COMMERCIAL

## 2024-02-12 DIAGNOSIS — E11.9 DIABETES MELLITUS WITHOUT COMPLICATION: Primary | ICD-10-CM

## 2024-02-12 DIAGNOSIS — Z13.220 SCREENING FOR CHOLESTEROL LEVEL: ICD-10-CM

## 2024-02-12 NOTE — TELEPHONE ENCOUNTER
uACR, Hgb A1c, CMP, and Lipid panel due. Orders placed per Written Order Guidelines. Pending PCP review.

## 2024-02-12 NOTE — PROGRESS NOTES
Reason for Outreach Encounter: Chart review. Health maintenance.     Colorectal cancer screening: Cologuard not returned. Order expires 2/13/2024. Pt also eligible for FIT or colonoscopy. PCP follow up 2/12/2024.  uACR, Hgb A1c, CMP, and Lipid panel due. Orders placed per Written Order Guidelines. Pending PCP review.      Health Maintenance   Topic Date Due   Colorectal CA Screening Never done   Hemoglobin A1c  2/16/2024   Diabetes Eye Exam 11/16/2024   Diabetes Foot Exam 2/13/2024   DM Urine Microalbumin/Creatinine Ratio  2/13/2024   eGFR (CMP w/ eGFR) 5/15/2024   Lipid Panel 2/13/2024   Statin Therapy For The Prevention & Treatment of CVD 11/16/2024   Smoking Cessation- Tobacco Quit Date Non smoker      Controlled? Medication Compliance?   HTN: Blood Pressure Control Yes    Yes      Diabetes: A1c Control (< 8%)  No      No          Prescribed? Medication Compliance?   Statin Medication  Yes    No          VACCINES:  Tetanus:  Influenza (Flu):   Pneumococcal:  Shingles/Varicella-Zoster:

## 2024-03-06 ENCOUNTER — PATIENT OUTREACH (OUTPATIENT)
Dept: ADMINISTRATIVE | Facility: HOSPITAL | Age: 54
End: 2024-03-06
Payer: COMMERCIAL

## 2024-03-06 ENCOUNTER — PATIENT MESSAGE (OUTPATIENT)
Dept: ADMINISTRATIVE | Facility: HOSPITAL | Age: 54
End: 2024-03-06
Payer: COMMERCIAL

## 2024-03-06 NOTE — PROGRESS NOTES
Reason for Outreach Encounter: Chart review. Health maintenance.                 []  Non-Compliant Report              [x]  Payor Report ([employer], PHN, BCBS, MSSP, MCIP, UHC, etc.)              []  Pre-Visit Chart Review                 Medication Adherence for Diabetes- meds due for refill. Portal msg reminder sent to fill medications.  Medication Adherence for Hypertension-  medication compliant per dispense hx.   Kidney Health Evaluation for patients with diabetes- eGFR (CMP) and uACR ordered per [facility] 2024.  Colorectal cancer screening- Cologuard not returned. Order exp 2024. Attempt made to contact patient. No answer.       Population Health Chart Review & Patient Outreach Details      Further Action Needed If Patient Returns Outreach:            Updates Requested / Reviewed:     []  Care Everywhere    []     []  External Sources ([facility], [facility], DIS, etc.)    [] [facility]   [] [facility]   [] Other:    []  Care Team Updated   []  Removed  or Duplicate Orders   []  Immunization Reconciliation Completed / Queried    [] [place]   [] [place]   [] [place]   [] [place]      Health Maintenance Topics Addressed and Outreach Outcomes / Actions Taken:             Breast Cancer Screening []  Mammogram Order Placed    []  Mammogram Screening Scheduled    []  External Records Requested & Care Team Updated if Applicable    []  External Records Uploaded & Care Team Updated if Applicable    []  Pt Declined Scheduling Mammogram    []  Pt Will Schedule with External Provider / Order Routed & Care Team Updated if Applicable              Cervical Cancer Screening []  Pap Smear Scheduled in Primary Care or OBGYN    []  External Records Requested & Care Team Updated if Applicable       []  External Records Uploaded, Care Team Updated, & History Updated if Applicable    []  Patient Declined Scheduling Pap Smear    []  Patient Will Schedule with External Provider & Care Team Updated if Applicable                   Colorectal Cancer Screening []  Colonoscopy Case Request / Referral / Home Test Order Placed    []  External Records Requested & Care Team Updated if Applicable    []  External Records Uploaded, Care Team Updated, & History Updated if Applicable    []  Patient Declined Completing Colon Cancer Screening    []  Patient Will Schedule with External Provider & Care Team Updated if Applicable    []  Fit Kit Mailed (add the SmartPhrase under additional notes)    []  Reminded Patient to Complete Home Test                Diabetic Eye Exam []  Eye Exam Screening Order Placed    []  Eye Camera Scheduled or Optometry/Ophthalmology Referral Placed    []  External Records Requested & Care Team Updated if Applicable    []  External Records Uploaded, Care Team Updated, & History Updated if Applicable    []  Patient Declined Scheduling Eye Exam    []  Patient Will Schedule with External Provider & Care Team Updated if Applicable             Blood Pressure Control []  Primary Care Follow Up Visit Scheduled     []  Remote Blood Pressure Reading Captured    []  Patient Declined Remote Reading or Scheduling Appt - Escalated to PCP    []  Patient Will Call Back or Send Portal Message with Reading                 HbA1c & Other Labs [x]  Overdue Lab(s) Ordered per wog 2/12/24    []  Overdue Lab(s) Scheduled    []  External Records Uploaded & Care Team Updated if Applicable    []  Primary Care Follow Up Visit Scheduled     []  Reminded Patient to Complete A1c Home Test    []  Patient Declined Scheduling Labs or Will Call Back to Schedule    []  Patient Will Schedule with External Provider / Order Routed, & Care Team Updated if Applicable           Primary Care Appointment []  Primary Care Appt Scheduled    []  Patient Declined Scheduling or Will Call Back to Schedule    []  Pt Established with External Provider, Updated Care Team, & Informed Pt to Notify Payor if Applicable           Medication Adherence /    Statin Use []  Primary Care  Appointment Scheduled    [x]  Patient Reminded to  Prescription    []  Patient Declined, Provider Notified if Needed    []  Sent Provider Message to Review to Evaluate Pt for Statin, Add Exclusion Dx Codes, Document   Exclusion in Problem List, Change Statin Intensity Level to Moderate or High Intensity if Applicable                Osteoporosis Screening []  Dexa Order Placed    []  Dexa Appointment Scheduled    []  External Records Requested & Care Team Updated    []  External Records Uploaded, Care Team Updated, & History Updated if Applicable    []  Patient Declined Scheduling Dexa or Will Call Back to Schedule    []  Patient Will Schedule with External Provider / Order Routed & Care Team Updated if Applicable       Additional Notes:

## 2024-03-21 ENCOUNTER — PATIENT OUTREACH (OUTPATIENT)
Dept: ADMINISTRATIVE | Facility: HOSPITAL | Age: 54
End: 2024-03-21
Payer: COMMERCIAL

## 2024-03-21 ENCOUNTER — TELEPHONE (OUTPATIENT)
Dept: FAMILY MEDICINE | Facility: CLINIC | Age: 54
End: 2024-03-21
Payer: COMMERCIAL

## 2024-03-21 DIAGNOSIS — Z12.11 SCREENING FOR MALIGNANT NEOPLASM OF COLON: Primary | ICD-10-CM

## 2024-03-21 NOTE — PROGRESS NOTES
Health Maintenance Topic(s) Outreach Outcomes & Actions Taken:    Primary Care Appt - Outreach Outcomes & Actions Taken  : Voicemail message left. Message sent to clinic.    Colorectal Cancer Screening - Outreach Outcomes & Actions Taken  : No answer, msg left    Lab(s) - Outreach Outcomes & Actions Taken  : Overdue Lab(s) Ordered and msg left- labs ordered per [facility]OG 2/13/24.    Medication Adherence / Statins - Outreach Outcomes & Actions Taken  : Left message reminding to diabetes medications       Additional Notes:  Appt 2/16/24 cancelled. Encouraged r/s of appt via portal. Message sent to clinic to contact for appt.          Care Management, Digital Medicine, and/or Education Referrals      Next Steps - Referral Actions: Digital Medicine Outcomes and Actions Taken: Pt Declined or Not Eligible

## 2024-03-21 NOTE — PROGRESS NOTES
Health Maintenance Topic(s) Outreach Outcomes & Actions Taken:    Colorectal Cancer Screening - Outreach Outcomes & Actions Taken  : Colonoscopy Case Request / Referral / Home Test Order Placed    Primary Care Appt - Outreach Outcomes & Actions Taken  : Primary Care Appt Scheduled    Lab(s) - Outreach Outcomes & Actions Taken  : Pt will complete prior to upcoming pcp f/u appt     Additional Notes:  Pt returned call. Referral for colonoscopy sent to Dr Yovani Mobley. Pt agrees to colonoscopy vs cologuard.   SDOH completed.  No CM referrals.  Follow up pcp appt scheduled 4/11/2024. Called pt and notified.  Pt will complete labs prior to appt. Ordered per [facility] 2/12/2024          Care Management, Digital Medicine, and/or Education Referrals      Next Steps - Referral Actions: Digital Medicine Outcomes and Actions Taken: Pt Declined or Not Eligible

## 2024-03-21 NOTE — TELEPHONE ENCOUNTER
Pt wants  New rx [employer] 14 day sensor  Sent to [facility] . Unable to pend in a Rx refill [facility] pharmacy not in the system .      [facility] # [phone] Referral for colonoscopy sent to Dr Yovani Mobley. Pt agrees to colonoscopy vs cologuard. Order placed per Written Order Guidelines. Pending PCP review.

## 2024-04-11 ENCOUNTER — OFFICE VISIT (OUTPATIENT)
Dept: FAMILY MEDICINE | Facility: CLINIC | Age: 54
End: 2024-04-11
Payer: COMMERCIAL

## 2024-04-11 VITALS
BODY MASS INDEX: 44.1 KG/M2 | HEIGHT: 71 IN | HEART RATE: 84 BPM | WEIGHT: 315 LBS | DIASTOLIC BLOOD PRESSURE: 88 MMHG | SYSTOLIC BLOOD PRESSURE: 138 MMHG | TEMPERATURE: 98 F | OXYGEN SATURATION: 97 % | RESPIRATION RATE: 20 BRPM

## 2024-04-11 DIAGNOSIS — E11.65 TYPE 2 DIABETES MELLITUS WITH HYPERGLYCEMIA, UNSPECIFIED WHETHER LONG TERM INSULIN USE: ICD-10-CM

## 2024-04-11 DIAGNOSIS — E11.65 TYPE 2 DIABETES MELLITUS WITH HYPERGLYCEMIA, WITHOUT LONG-TERM CURRENT USE OF INSULIN: Primary | Chronic | ICD-10-CM

## 2024-04-11 DIAGNOSIS — E03.9 HYPOTHYROIDISM, UNSPECIFIED TYPE: ICD-10-CM

## 2024-04-11 DIAGNOSIS — E11.9 DIABETES MELLITUS WITHOUT COMPLICATION: ICD-10-CM

## 2024-04-11 DIAGNOSIS — Z87.39 HISTORY OF GOUT: ICD-10-CM

## 2024-04-11 DIAGNOSIS — Z13.220 SCREENING FOR CHOLESTEROL LEVEL: ICD-10-CM

## 2024-04-11 DIAGNOSIS — E78.5 HYPERLIPIDEMIA, UNSPECIFIED HYPERLIPIDEMIA TYPE: Chronic | ICD-10-CM

## 2024-04-11 LAB
ALBUMIN SERPL-MCNC: 3.9 G/DL (ref 3.5–5)
ALBUMIN/GLOB SERPL: 1 RATIO (ref 1.1–2)
ALP SERPL-CCNC: 93 UNIT/L (ref 40–150)
ALT SERPL-CCNC: 41 UNIT/L (ref 0–55)
AST SERPL-CCNC: 35 UNIT/L (ref 5–34)
BILIRUB SERPL-MCNC: 0.6 MG/DL
BUN SERPL-MCNC: 11.4 MG/DL (ref 8.4–25.7)
CALCIUM SERPL-MCNC: 10.1 MG/DL (ref 8.4–10.2)
CHLORIDE SERPL-SCNC: 103 MMOL/L (ref 98–107)
CHOLEST SERPL-MCNC: 226 MG/DL
CHOLEST/HDLC SERPL: 5 {RATIO} (ref 0–5)
CO2 SERPL-SCNC: 26 MMOL/L (ref 22–29)
CREAT SERPL-MCNC: 1.09 MG/DL (ref 0.73–1.18)
CREAT UR-MCNC: 107.8 MG/DL (ref 63–166)
EST. AVERAGE GLUCOSE BLD GHB EST-MCNC: 220.2 MG/DL
GFR SERPLBLD CREATININE-BSD FMLA CKD-EPI: >60 MLS/MIN/1.73/M2
GLOBULIN SER-MCNC: 4.1 GM/DL (ref 2.4–3.5)
GLUCOSE SERPL-MCNC: 300 MG/DL (ref 74–100)
HBA1C MFR BLD: 9.3 %
HDLC SERPL-MCNC: 43 MG/DL (ref 35–60)
LDLC SERPL CALC-MCNC: 108 MG/DL (ref 50–140)
MICROALBUMIN UR-MCNC: 371.5 UG/ML
MICROALBUMIN/CREAT RATIO PNL UR: 344.6 MG/GM CR (ref 0–30)
POTASSIUM SERPL-SCNC: 4.4 MMOL/L (ref 3.5–5.1)
PROT SERPL-MCNC: 8 GM/DL (ref 6.4–8.3)
SODIUM SERPL-SCNC: 139 MMOL/L (ref 136–145)
TRIGL SERPL-MCNC: 373 MG/DL (ref 34–140)
TSH SERPL-ACNC: 2.5 UIU/ML (ref 0.35–4.94)
URATE SERPL-MCNC: 6.5 MG/DL (ref 3.5–7.2)
VLDLC SERPL CALC-MCNC: 75 MG/DL

## 2024-04-11 PROCEDURE — 80061 LIPID PANEL: CPT

## 2024-04-11 PROCEDURE — 36415 COLL VENOUS BLD VENIPUNCTURE: CPT

## 2024-04-11 PROCEDURE — 1159F MED LIST DOCD IN RCRD: CPT | Mod: CPTII,,,

## 2024-04-11 PROCEDURE — 82043 UR ALBUMIN QUANTITATIVE: CPT

## 2024-04-11 PROCEDURE — 3008F BODY MASS INDEX DOCD: CPT | Mod: CPTII,,,

## 2024-04-11 PROCEDURE — 1160F RVW MEDS BY RX/DR IN RCRD: CPT | Mod: CPTII,,,

## 2024-04-11 PROCEDURE — 84550 ASSAY OF BLOOD/URIC ACID: CPT

## 2024-04-11 PROCEDURE — 99214 OFFICE O/P EST MOD 30 MIN: CPT | Mod: PBBFAC,PN

## 2024-04-11 PROCEDURE — 83036 HEMOGLOBIN GLYCOSYLATED A1C: CPT

## 2024-04-11 PROCEDURE — 99214 OFFICE O/P EST MOD 30 MIN: CPT | Mod: S$PBB,,,

## 2024-04-11 PROCEDURE — 80053 COMPREHEN METABOLIC PANEL: CPT

## 2024-04-11 PROCEDURE — 3075F SYST BP GE 130 - 139MM HG: CPT | Mod: CPTII,,,

## 2024-04-11 PROCEDURE — 4010F ACE/ARB THERAPY RXD/TAKEN: CPT | Mod: CPTII,,,

## 2024-04-11 PROCEDURE — 3079F DIAST BP 80-89 MM HG: CPT | Mod: CPTII,,,

## 2024-04-11 PROCEDURE — 84443 ASSAY THYROID STIM HORMONE: CPT

## 2024-04-11 RX ORDER — SEMAGLUTIDE 1.34 MG/ML
1 INJECTION, SOLUTION SUBCUTANEOUS
Qty: 3 ML | Refills: 2 | Status: SHIPPED | OUTPATIENT
Start: 2024-04-11 | End: 2024-07-10

## 2024-04-11 RX ORDER — METFORMIN HYDROCHLORIDE 1000 MG/1
1000 TABLET ORAL DAILY
Qty: 90 TABLET | Refills: 0 | Status: SHIPPED | OUTPATIENT
Start: 2024-04-11 | End: 2024-07-10

## 2024-04-11 RX ORDER — FLUTICASONE PROPIONATE 93 UG/1
1 SPRAY, METERED NASAL 2 TIMES DAILY
COMMUNITY
Start: 2024-03-19

## 2024-04-11 RX ORDER — GLIPIZIDE 5 MG/1
5 TABLET ORAL DAILY
Qty: 90 TABLET | Refills: 1 | Status: SHIPPED | OUTPATIENT
Start: 2024-04-11 | End: 2024-10-08

## 2024-04-11 NOTE — PROGRESS NOTES
Patient Name: Freeman Louis     : 1970    MRN: [id number]     Subjective:     Patient ID: Freeman Louis is a 53 y.o. male.    Chief Complaint:   Chief Complaint   Patient presents with    Follow-up     F/u appointment. POC A1C. States pharmacy hasn't been refilling Metformin. States Ozempic hasn't been filled due to out of stock.         HPI: 2024:  Patient presents to clinic for routine follow-up of diabetes, hypertension, hyperlipidemia and gout.  Patient previously stable with allopurinol 100 mg daily, will reassess uric acid level today denies any flare ups.  Patient has had difficult time obtaining medications for diabetes from pharmacy.  States that he was able to receive for Ozempic pen injectors and states that at this time his blood sugars were all below 120.  Felt very great and was stable on this medication.  States that since then pharmacy has been out of stock, did not attempt to transfer this to any different pharmacy to continue medication.  States that pharmacy also gave him a lower dose of metformin and was curious if he is supposed to be taking that.  Discussed with patient that he is still switch to be on a 1000 mg of metformin daily.  Has since establish care with ENT, has new BiPAP machine as well as nasal spray which has been improving his breathing and sleep quality.  Referral was placed to GI for patient to have colonoscopy performed, states that he has not heard from this provider.  Phone number provided to patient today so that he may follow-up with them. Patient denies chest pain, palpitations, and shortness of breath.  Patient denies fever, night sweats, chills, nausea, vomiting, diarrhea, constipation, weight loss, and changes in appetite.      2023:  Patient presents to clinic today for routine follow-up of diabetes, hypertension, hyperlipidemia.  Today is also requesting a an evaluation for his sleep apnea.  States that he was diagnosed with obstructive sleep  apnea that was centralized about 10 years ago.  He has been on BiPAP for management of the symptoms with fullface mask since then.  Patient states he is unable to sleep without his machine.  Further states that he needs new parts and is aware that he will likely need a new evaluation and treatment.  He is amenable for referral to be placed today.  He is also asking for referral to ENT, states that he is suffered with chronic sinus congestion for many years, previous patient of Dr. Lex Thompson but does not believe that this provider is practicing anymore.  He would like referral to new ENT in order to establish care and have further assessment.  States that he uses over-the-counter medications for allergies but was told he may need a surgery for his sinuses previously.  Patient A1c has increased, attempted to increase his Trulicity but due to insurance patient had to remain at the 1.5 mg per week injection, he would like to increase medication now that he has new insurance.  Patient denies chest pain, palpitations, and shortness of breath.  Patient denies fever, night sweats, chills, nausea, vomiting, diarrhea, constipation, weight loss, and changes in appetite.            ROS:       12 point review of systems conducted, negative except as stated in the history of present illness. See HPI for details.    History:     Past Medical History:   Diagnosis Date    Diabetes mellitus, type 2     Hypertension         Past Surgical History:   Procedure Laterality Date    APPENDECTOMY         History reviewed. No pertinent family history.     Social History     Tobacco Use    Smoking status: Never    Smokeless tobacco: Never   Substance and Sexual Activity    Alcohol use: Not Currently    Drug use: Never    Sexual activity: Not Currently       Current Outpatient Medications   Medication Instructions    allopurinoL (ZYLOPRIM) 100 MG tablet Take one tablet by mouth daily    glipiZIDE (GLUCOTROL) 5 mg, Oral, Daily    levothyroxine  "(SYNTHROID) 100 mcg, Oral, Every morning    losartan (COZAAR) 100 mg, Oral, Daily    lovastatin (MEVACOR) 10 mg, Oral, Daily    metFORMIN (GLUCOPHAGE) 1,000 mg, Oral, Daily    OZEMPIC 1 mg, Subcutaneous, Every 7 days    XHANCE 93 mcg/actuation AerB 1 spray, 2 times daily        Review of patient's allergies indicates:  No Known Allergies    Objective:     Visit Vitals  /88 (BP Location: Left arm, Patient Position: Sitting)   Pulse 84   Temp 98 °F (36.7 °C) (Oral)   Resp 20   Ht 5' 11" (1.803 m)   Wt (!) 159.5 kg (351 lb 11.2 oz)   SpO2 97%   BMI 49.05 kg/m²       Physical Examination:     Physical Exam  Constitutional:       General: He is not in acute distress.     Appearance: Normal appearance. He is not ill-appearing.   Cardiovascular:      Rate and Rhythm: Normal rate and regular rhythm.      Heart sounds: Normal heart sounds.   Pulmonary:      Effort: Pulmonary effort is normal. No respiratory distress.      Breath sounds: Normal breath sounds.   Musculoskeletal:      Cervical back: Normal range of motion.   Skin:     General: Skin is warm and dry.   Neurological:      Mental Status: He is alert and oriented to person, place, and time.   Psychiatric:         Mood and Affect: Mood normal.         Behavior: Behavior normal.         Lab Results:     Chemistry:  Lab Results   Component Value Date     05/15/2023    K 4.5 05/15/2023    CHLORIDE 105 05/15/2023    BUN 12.5 05/15/2023    CREATININE 0.94 05/15/2023    EGFRNORACEVR >60 05/15/2023    GLUCOSE 159 (H) 05/15/2023    CALCIUM 9.8 05/15/2023    ALKPHOS 75 05/15/2023    LABPROT 7.8 05/15/2023    ALBUMIN 3.9 05/15/2023    BILIDIR 0.2 08/13/2021    IBILI 0.60 08/13/2021    AST 27 05/15/2023    ALT 35 05/15/2023    TSH 2.706 11/16/2023    SEQAPX4XSSN 1.08 02/13/2023    PSA 0.25 02/13/2023        Lab Results   Component Value Date    HGBA1C 7.4 (H) 05/15/2023        Hematology:  Lab Results   Component Value Date    WBC 7.8 02/13/2023    HGB 15.5 " 02/13/2023    HCT 45.5 02/13/2023     02/13/2023       Lipid Panel:  Lab Results   Component Value Date    CHOL 176 02/13/2023    HDL 41 02/13/2023    LDL 94.00 02/13/2023    TRIG 206 (H) 02/13/2023    TOTALCHOLEST 4 02/13/2023        Urine:  Lab Results   Component Value Date    APPEARANCEUA Clear 05/10/2021    PROTEINUA Negative 05/10/2021    LEUKOCYTESUR Negative 05/10/2021    RBCUA 0-2 05/10/2021    WBCUA 0-2 05/10/2021    BACTERIA None Seen 05/10/2021    SQEPUA 1-2 05/10/2021    HYALINECASTS 0-2 (A) 05/10/2021    CREATRANDUR 146.4 02/13/2023        Assessment:          ICD-10-CM ICD-9-CM   1. Type 2 diabetes mellitus with hyperglycemia, without long-term current use of insulin  E11.65 250.00     790.29   2. Screening for cholesterol level  Z13.220 V77.91   3. Diabetes mellitus without complication  E11.9 250.00   4. Hyperlipidemia, unspecified hyperlipidemia type  E78.5 272.4   5. History of gout  Z87.39 V12.29   6. Hypothyroidism, unspecified type  E03.9 244.9   7. Type 2 diabetes mellitus with hyperglycemia, unspecified whether long term insulin use  E11.65 250.00        Plan:     1. Type 2 diabetes mellitus with hyperglycemia, without long-term current use of insulin  Overview:  Encouraged ACE/ARB/Statin according to guidelines.  Follow ADA Diet. Avoid soda, simple sweets, and limit rice/pasta/breads/starches.  Maintain healthy weight with goal BMI <30. Exercise 5 times per week for 30 minutes per day.  Stressed importance of daily foot exams.  Stressed importance of annual dilated eye exam.  Last foot exam: 4/11/24  Last eye exam: 11/16/2023  Last micro albumin: 4/11/24      Assessment & Plan:  Chronic issue  Patient has been taking glipizide 5 mg daily as well as metformin 1000 mg daily.  He has not started Ozempic secondary to medication shortages with pharmacy.    Orders:  -     Hemoglobin A1C  -     Microalbumin/Creatinine Ratio, Urine  -     Comprehensive Metabolic Panel  -     glipiZIDE  (GLUCOTROL) 5 MG tablet; Take 1 tablet (5 mg total) by mouth once daily.  Dispense: 90 tablet; Refill: 1  -     metFORMIN (GLUCOPHAGE) 1000 MG tablet; Take 1 tablet (1,000 mg total) by mouth once daily.  Dispense: 90 tablet; Refill: 0    2. Screening for cholesterol level  -     Lipid Panel    3. Diabetes mellitus without complication  -     Comprehensive Metabolic Panel  -     Hemoglobin A1C  -     Microalbumin/Creatinine Ratio, Urine    4. Hyperlipidemia, unspecified hyperlipidemia type  Assessment & Plan:  Stressed importance of dietary modifications. Follow a low cholesterol, low saturated fat diet with less that 200mg of cholesterol a day.  Avoid fried foods and high saturated fats (high saturated fats less than 7% of calories).  Add Flax Seed/Fish Oil supplements to diet. Increase dietary fiber.  Regular exercise can reduce LDL and raise HDL. Stressed importance of physical activity 5 times per week for 30 minutes per day.       FLP today, continue lovastatin 10 mg while pending      Orders:  -     Lipid Panel  -     Comprehensive Metabolic Panel    5. History of gout  -     Uric Acid    6. Hypothyroidism, unspecified type  -     TSH    7. Type 2 diabetes mellitus with hyperglycemia, unspecified whether long term insulin use  Overview:  Encouraged ACE/ARB/Statin according to guidelines.  Follow ADA Diet. Avoid soda, simple sweets, and limit rice/pasta/breads/starches.  Maintain healthy weight with goal BMI <30. Exercise 5 times per week for 30 minutes per day.  Stressed importance of daily foot exams.  Stressed importance of annual dilated eye exam.  Last foot exam: 4/11/24  Last eye exam: 11/16/2023  Last micro albumin: 4/11/24      Assessment & Plan:  Chronic issue  Patient has been taking glipizide 5 mg daily as well as metformin 1000 mg daily.  He has not started Ozempic secondary to medication shortages with pharmacy.    Orders:  -     semaglutide (OZEMPIC) 1 mg/dose (4 mg/3 mL); Inject 1 mg into the skin  every 7 days.  Dispense: 3 mL; Refill: 2         Follow up in about 3 months (around 7/11/2024), or if symptoms worsen or fail to improve.    Future Appointments   Date Time Provider Department Center   7/15/2024  1:00 PM Liza Oconnor NP [facility]        Liza Oconnor NP

## 2024-04-11 NOTE — ASSESSMENT & PLAN NOTE
Chronic issue  Patient has been taking glipizide 5 mg daily as well as metformin 1000 mg daily.  He has not started Ozempic secondary to medication shortages with pharmacy.

## 2024-04-11 NOTE — ASSESSMENT & PLAN NOTE
Stressed importance of dietary modifications. Follow a low cholesterol, low saturated fat diet with less that 200mg of cholesterol a day.  Avoid fried foods and high saturated fats (high saturated fats less than 7% of calories).  Add Flax Seed/Fish Oil supplements to diet. Increase dietary fiber.  Regular exercise can reduce LDL and raise HDL. Stressed importance of physical activity 5 times per week for 30 minutes per day.       FLP today, continue lovastatin 10 mg while pending

## 2024-04-12 ENCOUNTER — PATIENT MESSAGE (OUTPATIENT)
Dept: FAMILY MEDICINE | Facility: CLINIC | Age: 54
End: 2024-04-12
Payer: COMMERCIAL

## 2024-04-12 DIAGNOSIS — E78.5 HYPERLIPIDEMIA, UNSPECIFIED HYPERLIPIDEMIA TYPE: ICD-10-CM

## 2024-04-12 RX ORDER — LOVASTATIN 20 MG/1
20 TABLET ORAL NIGHTLY
Qty: 90 TABLET | Refills: 1 | Status: SHIPPED | OUTPATIENT
Start: 2024-04-12 | End: 2024-10-09

## 2024-06-17 ENCOUNTER — PATIENT OUTREACH (OUTPATIENT)
Facility: CLINIC | Age: 54
End: 2024-06-17
Payer: COMMERCIAL

## 2024-06-17 NOTE — PROGRESS NOTES
Health Maintenance Topic(s) Outreach Outcomes & Actions Taken:    Colorectal Cancer Screening - Outreach Outcomes & Actions Taken  : Referral to Dr Yovani Mobley at [facility] sent on 3/21/2024    Lab(s) - Outreach Outcomes & Actions Taken  : A1c due 7/11/2024. Ordered per pcp to review at next visit on 7/15/2024.       Additional Notes:  Population health outreach for health maintenance.   Attempt made to contact patient. No answer. Message left with name and phone number for callback. Not active on portal. Letter mailed to pt.     Colon Cancer Screening due -Referral 3/21/2024.  [facility]. Letter sent to pt on 5/3/2024.         Care Management, Digital Medicine, and/or Education Referrals      Next Steps - Referral Actions: Digital Medicine Outcomes and Actions Taken: Pt Declined or Not Eligible

## 2024-06-17 NOTE — LETTER
Dear Brian [facility] is committed to your overall health. Periodically we review the health information in your chart to make sure you are up to date on all of your recommended tests and/or procedures.       Our review of your chart shows that you may be due for:      Colorectal Cancer Screening      [facility] / Dr Yovani Mobley has been unsuccessful in contacting you regarding your colorectal cancer screening. Please call [phone] to schedule your appointment.    If you have had any of the above done at another facility, please let us know and we will request a copy of the report to update your [facility] record.     If you need any help to get these items scheduled or if there is anything else we can do to help you, please send me a message via your patient portal or give me a call at [phone].  I am looking forward to speaking with you soon.     Sincerely,      SANJUANA Sal Care Coordinator  Liza Oconnor NP and your [facility] Team

## 2024-07-15 ENCOUNTER — OFFICE VISIT (OUTPATIENT)
Dept: FAMILY MEDICINE | Facility: CLINIC | Age: 54
End: 2024-07-15
Payer: COMMERCIAL

## 2024-07-15 ENCOUNTER — PATIENT MESSAGE (OUTPATIENT)
Dept: FAMILY MEDICINE | Facility: CLINIC | Age: 54
End: 2024-07-15

## 2024-07-15 VITALS
DIASTOLIC BLOOD PRESSURE: 84 MMHG | BODY MASS INDEX: 44.1 KG/M2 | HEIGHT: 71 IN | HEART RATE: 72 BPM | SYSTOLIC BLOOD PRESSURE: 132 MMHG | OXYGEN SATURATION: 95 % | RESPIRATION RATE: 20 BRPM | WEIGHT: 315 LBS | TEMPERATURE: 99 F

## 2024-07-15 DIAGNOSIS — E03.9 HYPOTHYROIDISM, UNSPECIFIED TYPE: ICD-10-CM

## 2024-07-15 DIAGNOSIS — I10 PRIMARY HYPERTENSION: ICD-10-CM

## 2024-07-15 DIAGNOSIS — E78.5 HYPERLIPIDEMIA, UNSPECIFIED HYPERLIPIDEMIA TYPE: ICD-10-CM

## 2024-07-15 DIAGNOSIS — E66.01 MORBID (SEVERE) OBESITY DUE TO EXCESS CALORIES: ICD-10-CM

## 2024-07-15 DIAGNOSIS — E11.69 TYPE 2 DIABETES MELLITUS WITH OTHER SPECIFIED COMPLICATION, UNSPECIFIED WHETHER LONG TERM INSULIN USE: Primary | ICD-10-CM

## 2024-07-15 LAB — HBA1C MFR BLD: 7.5 %

## 2024-07-15 PROCEDURE — 3079F DIAST BP 80-89 MM HG: CPT | Mod: CPTII,,,

## 2024-07-15 PROCEDURE — 99213 OFFICE O/P EST LOW 20 MIN: CPT | Mod: PBBFAC,PN

## 2024-07-15 PROCEDURE — 1159F MED LIST DOCD IN RCRD: CPT | Mod: CPTII,,,

## 2024-07-15 PROCEDURE — 99214 OFFICE O/P EST MOD 30 MIN: CPT | Mod: S$PBB,,,

## 2024-07-15 PROCEDURE — 1160F RVW MEDS BY RX/DR IN RCRD: CPT | Mod: CPTII,,,

## 2024-07-15 PROCEDURE — 3008F BODY MASS INDEX DOCD: CPT | Mod: CPTII,,,

## 2024-07-15 PROCEDURE — 3066F NEPHROPATHY DOC TX: CPT | Mod: CPTII,,,

## 2024-07-15 PROCEDURE — 83036 HEMOGLOBIN GLYCOSYLATED A1C: CPT | Mod: PBBFAC,PN

## 2024-07-15 PROCEDURE — 4010F ACE/ARB THERAPY RXD/TAKEN: CPT | Mod: CPTII,,,

## 2024-07-15 PROCEDURE — 3046F HEMOGLOBIN A1C LEVEL >9.0%: CPT | Mod: CPTII,,,

## 2024-07-15 PROCEDURE — 3075F SYST BP GE 130 - 139MM HG: CPT | Mod: CPTII,,,

## 2024-07-15 PROCEDURE — 3062F POS MACROALBUMINURIA REV: CPT | Mod: CPTII,,,

## 2024-07-15 RX ORDER — SEMAGLUTIDE 2.68 MG/ML
2 INJECTION, SOLUTION SUBCUTANEOUS
Qty: 3 ML | Refills: 4 | Status: SHIPPED | OUTPATIENT
Start: 2024-07-15 | End: 2024-12-02

## 2024-07-15 RX ORDER — LOSARTAN POTASSIUM 100 MG/1
100 TABLET ORAL DAILY
Qty: 90 TABLET | Refills: 1 | Status: SHIPPED | OUTPATIENT
Start: 2024-07-15 | End: 2025-01-11

## 2024-07-15 RX ORDER — LEVOTHYROXINE SODIUM 100 UG/1
100 TABLET ORAL EVERY MORNING
Qty: 90 TABLET | Refills: 1 | Status: SHIPPED | OUTPATIENT
Start: 2024-07-15 | End: 2025-01-11

## 2024-07-15 RX ORDER — LOVASTATIN 20 MG/1
20 TABLET ORAL NIGHTLY
Qty: 90 TABLET | Refills: 1 | Status: SHIPPED | OUTPATIENT
Start: 2024-07-15 | End: 2025-01-11

## 2024-07-15 NOTE — PROGRESS NOTES
Patient Name: Freeman Louis     : 1970    MRN: [id number]     Subjective:     Patient ID: Freeman Louis is a 54 y.o. male.    Chief Complaint:   Chief Complaint   Patient presents with    Follow-up     3 month f/u. Requesting refills on Levothyroxine.         HPI: 07/15/2024:  Patient here for routine follow-up of diabetes, thyroid medication.  Patient needs refill of his thyroid medication.  States that he is sleeping good, has been losing weight denies any palpitations thinning of hair or nails.  Patient does complain of.  About 2 months previously where he felt like his blood sugar was getting lower, he backed off oral medications and this resolved symptoms.  States that he has been doing good with meal prepping and working out.  A1c had drastic improvement with once weekly injectable. Patient denies chest pain, palpitations, and shortness of breath.  Patient denies fever, night sweats, chills, nausea, vomiting, diarrhea, constipation, weight loss, and changes in appetite.      2024:  Patient presents to clinic for routine follow-up of diabetes, hypertension, hyperlipidemia and gout.  Patient previously stable with allopurinol 100 mg daily, will reassess uric acid level today denies any flare ups.  Patient has had difficult time obtaining medications for diabetes from pharmacy.  States that he was able to receive for Ozempic pen injectors and states that at this time his blood sugars were all below 120.  Felt very great and was stable on this medication.  States that since then pharmacy has been out of stock, did not attempt to transfer this to any different pharmacy to continue medication.  States that pharmacy also gave him a lower dose of metformin and was curious if he is supposed to be taking that.  Discussed with patient that he is still switch to be on a 1000 mg of metformin daily.  Has since establish care with ENT, has new BiPAP machine as well as nasal spray which has been improving his  breathing and sleep quality.  Referral was placed to GI for patient to have colonoscopy performed, states that he has not heard from this provider.  Phone number provided to patient today so that he may follow-up with them. Patient denies chest pain, palpitations, and shortness of breath.  Patient denies fever, night sweats, chills, nausea, vomiting, diarrhea, constipation, weight loss, and changes in appetite.      11/16/2023:  Patient presents to clinic today for routine follow-up of diabetes, hypertension, hyperlipidemia.  Today is also requesting a an evaluation for his sleep apnea.  States that he was diagnosed with obstructive sleep apnea that was centralized about 10 years ago.  He has been on BiPAP for management of the symptoms with fullface mask since then.  Patient states he is unable to sleep without his machine.  Further states that he needs new parts and is aware that he will likely need a new evaluation and treatment.  He is amenable for referral to be placed today.  He is also asking for referral to ENT, states that he is suffered with chronic sinus congestion for many years, previous patient of Dr. Lex Thompson but does not believe that this provider is practicing anymore.  He would like referral to new ENT in order to establish care and have further assessment.  States that he uses over-the-counter medications for allergies but was told he may need a surgery for his sinuses previously.  Patient A1c has increased, attempted to increase his Trulicity but due to insurance patient had to remain at the 1.5 mg per week injection, he would like to increase medication now that he has new insurance.  Patient denies chest pain, palpitations, and shortness of breath.  Patient denies fever, night sweats, chills, nausea, vomiting, diarrhea, constipation, weight loss, and changes in appetite.            ROS:       12 point review of systems conducted, negative except as stated in the history of present illness.  "See HPI for details.    History:     Past Medical History:   Diagnosis Date    Diabetes mellitus, type 2     Hypertension         Past Surgical History:   Procedure Laterality Date    APPENDECTOMY         No family history on file.     Social History     Tobacco Use    Smoking status: Never    Smokeless tobacco: Never   Substance and Sexual Activity    Alcohol use: Yes     Comment: Occ    Drug use: Never    Sexual activity: Not Currently       Current Outpatient Medications   Medication Instructions    allopurinoL (ZYLOPRIM) 100 MG tablet Take one tablet by mouth daily    levothyroxine (SYNTHROID) 100 mcg, Oral, Every morning    losartan (COZAAR) 100 mg, Oral, Daily    lovastatin (MEVACOR) 20 mg, Oral, Nightly    metFORMIN (GLUCOPHAGE) 1,000 mg, Oral, Daily    OZEMPIC 2 mg, Subcutaneous, Every 7 days    XHANCE 93 mcg/actuation AerB 1 spray, 2 times daily        Review of patient's allergies indicates:  No Known Allergies    Objective:     Visit Vitals  /84 (BP Location: Right arm, Patient Position: Sitting)   Pulse 72   Temp 98.5 °F (36.9 °C) (Oral)   Resp 20   Ht 5' 11" (1.803 m)   Wt (!) 152.6 kg (336 lb 6.4 oz)   SpO2 95%   BMI 46.92 kg/m²       Physical Examination:     Physical Exam  Constitutional:       General: He is not in acute distress.     Appearance: Normal appearance. He is obese. He is not ill-appearing.   Cardiovascular:      Rate and Rhythm: Normal rate and regular rhythm.      Heart sounds: Normal heart sounds.   Pulmonary:      Effort: Pulmonary effort is normal. No respiratory distress.      Breath sounds: Normal breath sounds.   Musculoskeletal:      Cervical back: Normal range of motion.   Skin:     General: Skin is warm and dry.   Neurological:      Mental Status: He is alert and oriented to person, place, and time.   Psychiatric:         Mood and Affect: Mood normal.         Behavior: Behavior normal.     Protective Sensation (w/ 10 gram monofilament):  Right: Intact  Left: " Intact    Visual Inspection:  Normal -  Bilateral    Pedal Pulses:   Right: Present  Left: Present    Posterior Tibialis Pulses:   Right:Present  Left: Present    Lab Results:     Chemistry:  Lab Results   Component Value Date     04/11/2024    K 4.4 04/11/2024    BUN 11.4 04/11/2024    CREATININE 1.09 04/11/2024    EGFRNORACEVR >60 04/11/2024    GLUCOSE 300 (H) 04/11/2024    CALCIUM 10.1 04/11/2024    ALKPHOS 93 04/11/2024    LABPROT 8.0 04/11/2024    ALBUMIN 3.9 04/11/2024    BILIDIR 0.2 08/13/2021    IBILI 0.60 08/13/2021    AST 35 (H) 04/11/2024    ALT 41 04/11/2024    TSH 2.501 04/11/2024    MTYGBU0RKTA 1.08 02/13/2023    PSA 0.25 02/13/2023        Lab Results   Component Value Date    HGBA1C 9.3 (H) 04/11/2024        Hematology:  Lab Results   Component Value Date    WBC 7.8 02/13/2023    HGB 15.5 02/13/2023    HCT 45.5 02/13/2023     02/13/2023       Lipid Panel:  Lab Results   Component Value Date    CHOL 226 (H) 04/11/2024    HDL 43 04/11/2024    .00 04/11/2024    TRIG 373 (H) 04/11/2024    TOTALCHOLEST 5 04/11/2024        Urine:  Lab Results   Component Value Date    APPEARANCEUA Clear 05/10/2021    PROTEINUA Negative 05/10/2021    LEUKOCYTESUR Negative 05/10/2021    RBCUA 0-2 05/10/2021    WBCUA 0-2 05/10/2021    BACTERIA None Seen 05/10/2021    SQEPUA 1-2 05/10/2021    HYALINECASTS 0-2 (A) 05/10/2021    CREATRANDUR 107.8 04/11/2024        Assessment:          ICD-10-CM ICD-9-CM   1. Type 2 diabetes mellitus with other specified complication, unspecified whether long term insulin use  E11.69 250.80   2. Hypothyroidism, unspecified type  E03.9 244.9   3. Morbid (severe) obesity due to excess calories  E66.01 278.01   4. Primary hypertension  I10 401.9   5. Hyperlipidemia, unspecified hyperlipidemia type  E78.5 272.4        Plan:     1. Type 2 diabetes mellitus with other specified complication, unspecified whether long term insulin use  Overview:  >>OVERVIEW FOR TYPE 2 DIABETES MELLITUS  WITH HYPERGLYCEMIA WRITTEN ON 4/11/2024  1:36 PM BY SAMMIE HUTTON NP    Encouraged ACE/ARB/Statin according to guidelines.  Follow ADA Diet. Avoid soda, simple sweets, and limit rice/pasta/breads/starches.  Maintain healthy weight with goal BMI <30. Exercise 5 times per week for 30 minutes per day.  Stressed importance of daily foot exams.  Stressed importance of annual dilated eye exam.  Last foot exam: 7/15/24  Last eye exam: 11/16/2023  Last micro albumin: 4/11/24      Assessment & Plan:  Patient has since been able to start Ozempic, A1c with drastic improvement with addition of this medicine.  We will increase medicine to 2 mg weekly, advised patient to decrease usage of glipizide to prevent low blood sugar levels.  Continue metformin 1000 mg daily for now.    Orders:  -     POCT HEMOGLOBIN A1C  -     semaglutide (OZEMPIC) 2 mg/dose (8 mg/3 mL) PnIj; Inject 2 mg into the skin every 7 days.  Dispense: 3 mL; Refill: 4    2. Hypothyroidism, unspecified type  -     levothyroxine (SYNTHROID) 100 MCG tablet; Take 1 tablet (100 mcg total) by mouth every morning.  Dispense: 90 tablet; Refill: 1    3. Morbid (severe) obesity due to excess calories  Assessment & Plan:  BMI Body mass index is 46.92 kg/m².   Goal BMI <30.  Exercise 5 times a week for 30 minutes per day.  Avoid soda, simple sugars, excessive rice, potatoes or bread. Limit fast foods and fried foods.  Choose complex carbs in moderation (example: green vegetables, beans, oatmeal). Eat plenty of fresh fruits and vegetables with lean meats daily.  Do not skip meals. Eat a balanced portion size.  Avoid fad diets. Consider permanent healthy life style changes.           4. Primary hypertension  Assessment & Plan:  Low Sodium Diet (Dash Diet - less than 2 grams of sodium per day).  Monitor Blood Pressure daily and log. Report any consistent numbers greater than 140/90.  Smoking Cessation encouraged to aid in BP reduction.  Maintain healthy weight with  goal BMI <30. Exercise 30 minutes per day 5 days per week    Chronic stable, continue losartan 100 mg daily    Orders:  -     losartan (COZAAR) 100 MG tablet; Take 1 tablet (100 mg total) by mouth once daily.  Dispense: 90 tablet; Refill: 1    5. Hyperlipidemia, unspecified hyperlipidemia type  -     lovastatin (MEVACOR) 20 MG tablet; Take 1 tablet (20 mg total) by mouth every evening.  Dispense: 90 tablet; Refill: 1         Follow up in about 3 months (around 10/15/2024), or if symptoms worsen or fail to improve, for Fundus, urine micro at next visit, poc A1c; DM follow up (stopped glipizide, increase ozempic).    Future Appointments   Date Time Provider Department Center   10/16/2024  1:30 PM Liza Oconnor NP [facility]        Liza Oconnor NP

## 2024-07-15 NOTE — ASSESSMENT & PLAN NOTE
Patient has since been able to start Ozempic, A1c with drastic improvement with addition of this medicine.  We will increase medicine to 2 mg weekly, advised patient to decrease usage of glipizide to prevent low blood sugar levels.  Continue metformin 1000 mg daily for now.

## 2024-07-15 NOTE — ASSESSMENT & PLAN NOTE
BMI Body mass index is 46.92 kg/m².   Goal BMI <30.  Exercise 5 times a week for 30 minutes per day.  Avoid soda, simple sugars, excessive rice, potatoes or bread. Limit fast foods and fried foods.  Choose complex carbs in moderation (example: green vegetables, beans, oatmeal). Eat plenty of fresh fruits and vegetables with lean meats daily.  Do not skip meals. Eat a balanced portion size.  Avoid fad diets. Consider permanent healthy life style changes.

## 2024-07-15 NOTE — ASSESSMENT & PLAN NOTE
Low Sodium Diet (Dash Diet - less than 2 grams of sodium per day).  Monitor Blood Pressure daily and log. Report any consistent numbers greater than 140/90.  Smoking Cessation encouraged to aid in BP reduction.  Maintain healthy weight with goal BMI <30. Exercise 30 minutes per day 5 days per week    Chronic stable, continue losartan 100 mg daily

## 2024-09-16 DIAGNOSIS — E11.65 TYPE 2 DIABETES MELLITUS WITH HYPERGLYCEMIA, WITHOUT LONG-TERM CURRENT USE OF INSULIN: Chronic | ICD-10-CM

## 2024-09-16 NOTE — TELEPHONE ENCOUNTER
----- Message from Dee Dee Lima sent at 9/16/2024 12:36 PM CDT -----  Regarding: med refill  .Who Called: Freeman Louis    Refill or New Rx:Refill  RX Name and Strength:semaglutide (OZEMPIC) 2 mg/dose (8 mg/3 mL) PnIj   How is the patient currently taking it? (ex. 1XDay):  Is this a 30 day or 90 day RX:  Local or Mail Order:Local  List of preferred pharmacies on file (remove unneeded): [unfilled]  If different Pharmacy is requested, enter Pharmacy information here including location and phone number:[id number] - [place], [place] - [address]      Refill or New Rx:Refill  RX Name and Strength:Metformin 1000 MG  How is the patient currently taking it? (ex. 1XDay):1xday  Is this a 30 day or 90 day RX:30  Local or Mail Order:Local  List of preferred pharmacies on file (remove unneeded): [unfilled]  If different Pharmacy is requested, enter Pharmacy information here including location and phone number:[id number] - [place], [place] - [address]        Ordering Provider:Julio      Preferred Method of Contact: Phone Call  Patient's Preferred Phone Number on File: [phone]   Best Call Back Number, if different:  Additional Information:

## 2024-09-17 RX ORDER — METFORMIN HYDROCHLORIDE 1000 MG/1
1000 TABLET ORAL DAILY
Qty: 90 TABLET | Refills: 0 | Status: SHIPPED | OUTPATIENT
Start: 2024-09-17 | End: 2024-12-16

## 2024-10-07 ENCOUNTER — PATIENT OUTREACH (OUTPATIENT)
Facility: CLINIC | Age: 54
End: 2024-10-07
Payer: COMMERCIAL

## 2024-10-07 NOTE — PROGRESS NOTES
Attempt made to contact patient. No answer. Message left with name and phone number for callback. Letter mailed.  Colonoscopy referral to Yovani Mobley MD 3/21/24. Office unable to contact. Letter sent x 2. Letter # 3 today. Pt to call [phone] for appt.     DM Eye Exam due 11/16/2024

## 2024-10-07 NOTE — LETTER
Dear [facility] is committed to your overall health. Periodically we review the health information in your chart to make sure you are up to date on all of your recommended tests and/or procedures.       Our review of your chart shows that you may be due for     Colon Cancer Screening/ Colonoscopy    [facility] / Dr Yovani Mobley has been unsuccessful in contacting you regarding your colorectal cancer screening. Please call [phone] to schedule your appointment.    If you have had any of the above done at another facility, please let us know and we will request a copy of the report to update your [facility] record.     This is a friendly reminder that you have an appointment scheduled with Liza Oconnor NP on 10/16/2024 at 1:20 pm. If you have any questions, you may discuss the(se) screenings at that time if you choose to do so.    If you would like, I can help get these items ordered for you (if needed) and also see if there is anything else we can do to help you. You may respond to this message via your patient portal or give me a call at [phone].  I look forward to speaking with you.     Sincerely,    SANJUANA Sal Care Coordinator  Liza Oconnor NP and your [facility] Team

## 2024-10-11 ENCOUNTER — OFFICE VISIT (OUTPATIENT)
Dept: FAMILY MEDICINE | Facility: CLINIC | Age: 54
End: 2024-10-11
Payer: COMMERCIAL

## 2024-10-11 ENCOUNTER — CLINICAL SUPPORT (OUTPATIENT)
Dept: FAMILY MEDICINE | Facility: CLINIC | Age: 54
End: 2024-10-11
Payer: COMMERCIAL

## 2024-10-11 VITALS
BODY MASS INDEX: 44.1 KG/M2 | HEIGHT: 71 IN | HEART RATE: 61 BPM | RESPIRATION RATE: 20 BRPM | WEIGHT: 315 LBS | SYSTOLIC BLOOD PRESSURE: 143 MMHG | OXYGEN SATURATION: 97 % | TEMPERATURE: 98 F | DIASTOLIC BLOOD PRESSURE: 87 MMHG

## 2024-10-11 DIAGNOSIS — E03.9 HYPOTHYROIDISM, UNSPECIFIED TYPE: ICD-10-CM

## 2024-10-11 DIAGNOSIS — I10 PRIMARY HYPERTENSION: Chronic | ICD-10-CM

## 2024-10-11 DIAGNOSIS — E78.5 HYPERLIPIDEMIA, UNSPECIFIED HYPERLIPIDEMIA TYPE: Chronic | ICD-10-CM

## 2024-10-11 DIAGNOSIS — R53.83 FATIGUE, UNSPECIFIED TYPE: ICD-10-CM

## 2024-10-11 DIAGNOSIS — Z87.39 HISTORY OF GOUT: ICD-10-CM

## 2024-10-11 DIAGNOSIS — E11.65 TYPE 2 DIABETES MELLITUS WITH HYPERGLYCEMIA, WITHOUT LONG-TERM CURRENT USE OF INSULIN: Primary | ICD-10-CM

## 2024-10-11 DIAGNOSIS — E11.65 TYPE 2 DIABETES MELLITUS WITH HYPERGLYCEMIA, WITHOUT LONG-TERM CURRENT USE OF INSULIN: ICD-10-CM

## 2024-10-11 DIAGNOSIS — E11.69 TYPE 2 DIABETES MELLITUS WITH OTHER SPECIFIED COMPLICATION, UNSPECIFIED WHETHER LONG TERM INSULIN USE: Chronic | ICD-10-CM

## 2024-10-11 LAB
CHOLEST SERPL-MCNC: 188 MG/DL
CHOLEST/HDLC SERPL: 4 {RATIO} (ref 0–5)
HBA1C MFR BLD: 7.2 %
HDLC SERPL-MCNC: 49 MG/DL (ref 35–60)
LDLC SERPL CALC-MCNC: 111 MG/DL (ref 50–140)
T4 SERPL-MCNC: 7.38 UG/DL (ref 4.87–11.72)
TESTOST SERPL-MCNC: 294.78 NG/DL (ref 220.91–715.81)
TRIGL SERPL-MCNC: 139 MG/DL (ref 34–140)
TSH SERPL-ACNC: 2.72 UIU/ML (ref 0.35–4.94)
URATE SERPL-MCNC: 8.1 MG/DL (ref 3.5–7.2)
VLDLC SERPL CALC-MCNC: 28 MG/DL

## 2024-10-11 PROCEDURE — 80061 LIPID PANEL: CPT

## 2024-10-11 PROCEDURE — 84403 ASSAY OF TOTAL TESTOSTERONE: CPT

## 2024-10-11 PROCEDURE — 84443 ASSAY THYROID STIM HORMONE: CPT

## 2024-10-11 PROCEDURE — 84436 ASSAY OF TOTAL THYROXINE: CPT

## 2024-10-11 PROCEDURE — 84550 ASSAY OF BLOOD/URIC ACID: CPT

## 2024-10-11 PROCEDURE — 36415 COLL VENOUS BLD VENIPUNCTURE: CPT

## 2024-10-11 PROCEDURE — 99215 OFFICE O/P EST HI 40 MIN: CPT | Mod: PBBFAC,PN

## 2024-10-11 NOTE — ASSESSMENT & PLAN NOTE
Stressed importance of dietary modifications. Follow a low cholesterol, low saturated fat diet with less that 200mg of cholesterol a day.  Avoid fried foods and high saturated fats (high saturated fats less than 7% of calories).  Add Flax Seed/Fish Oil supplements to diet. Increase dietary fiber.  Regular exercise can reduce LDL and raise HDL. Stressed importance of physical activity 5 times per week for 30 minutes per day.       continue lovastatin 20 mg increased at last office visit, FLP today

## 2024-10-11 NOTE — PROGRESS NOTES
Freeman Louis is a 54 y.o. male here for a diabetic eye screening with non-dilated fundus photos per Liza Oconnor NP.    Patient cooperative?: Yes  Small pupils?: No  Last eye exam:       For exam results, see Encounter Report.

## 2024-10-11 NOTE — PROGRESS NOTES
Patient Name: Freeman Louis     : 1970    MRN: [id number]     Subjective:     Patient ID: Freeman Louis is a 54 y.o. male.    Chief Complaint:   Chief Complaint   Patient presents with    Follow-up     3 month f/u appointment. POC A1C. Wants to talk about testosterone levels. C/o fatigue.         HPI: 10/11/2024:  Patient presents for routine diabetes follow-up today, point of care A1c 7.2 in clinic.  He is also due for fundus and will complete today.  Patient denies any neuropathy, polydipsia, hyperglycemic or hypoglycemic episodes.  Patient today does complain of fatigue, is compliant with his CPAP machine but states that he has been having difficult time with daytime somnolence and lack of energy.  We will recheck his thyroid levels today, is compliant with his Synthroid and taking properly. Patient denies chest pain, palpitations, and shortness of breath.  Patient denies fever, night sweats, chills, nausea, vomiting, diarrhea, constipation, weight loss, and changes in appetite.        ROS:      12 point review of systems conducted, negative except as stated in the history of present illness. See HPI for details.    History:     Past Medical History:   Diagnosis Date    Diabetes mellitus, type 2     Hypertension         Past Surgical History:   Procedure Laterality Date    APPENDECTOMY         No family history on file.     Social History     Tobacco Use    Smoking status: Never    Smokeless tobacco: Never   Substance and Sexual Activity    Alcohol use: Yes     Comment: Occ    Drug use: Never    Sexual activity: Not Currently       Current Outpatient Medications   Medication Instructions    allopurinoL (ZYLOPRIM) 100 MG tablet Take one tablet by mouth daily    levothyroxine (SYNTHROID) 100 mcg, Oral, Every morning    losartan (COZAAR) 100 mg, Oral, Daily    lovastatin (MEVACOR) 20 mg, Oral, Nightly    metFORMIN (GLUCOPHAGE) 1,000 mg, Oral, Daily    OZEMPIC 2 mg, Subcutaneous, Every 7 days    XHANCE 93  "mcg/actuation AerB 1 spray, 2 times daily        Review of patient's allergies indicates:  No Known Allergies    Objective:     Visit Vitals  BP (!) 143/87   Pulse 61   Temp 98.3 °F (36.8 °C) (Oral)   Resp 20   Ht 5' 11" (1.803 m)   Wt (!) 154 kg (339 lb 6.4 oz)   SpO2 97%   BMI 47.34 kg/m²       Physical Examination:     Physical Exam  Vitals reviewed.   Constitutional:       Appearance: Normal appearance. He is obese.   HENT:      Head: Normocephalic.      Right Ear: Tympanic membrane, ear canal and external ear normal.      Left Ear: Tympanic membrane, ear canal and external ear normal.      Nose: Nose normal.      Mouth/Throat:      Mouth: Mucous membranes are moist.      Pharynx: Oropharynx is clear.   Eyes:      Extraocular Movements: Extraocular movements intact.      Conjunctiva/sclera: Conjunctivae normal.      Pupils: Pupils are equal, round, and reactive to light.   Cardiovascular:      Rate and Rhythm: Normal rate and regular rhythm.      Pulses: Normal pulses.      Heart sounds: Normal heart sounds.   Pulmonary:      Effort: Pulmonary effort is normal.      Breath sounds: Normal breath sounds.   Abdominal:      General: Abdomen is flat. Bowel sounds are normal.      Palpations: Abdomen is soft.   Musculoskeletal:         General: Normal range of motion.      Cervical back: Normal range of motion and neck supple.   Skin:     General: Skin is warm and dry.   Neurological:      General: No focal deficit present.      Mental Status: He is alert and oriented to person, place, and time.   Psychiatric:         Mood and Affect: Mood normal.         Behavior: Behavior normal.           Assessment:          ICD-10-CM ICD-9-CM   1. Type 2 diabetes mellitus with hyperglycemia, without long-term current use of insulin  E11.65 250.00     790.29   2. Hypothyroidism, unspecified type  E03.9 244.9   3. Fatigue, unspecified type  R53.83 780.79   4. Hyperlipidemia, unspecified hyperlipidemia type  E78.5 272.4   5. History " of gout  Z87.39 V12.29   6. Primary hypertension  I10 401.9   7. Type 2 diabetes mellitus with other specified complication, unspecified whether long term insulin use  E11.69 250.80        Plan:     1. Type 2 diabetes mellitus with hyperglycemia, without long-term current use of insulin  -     Hemoglobin A1C, POCT; Future; Expected date: 10/11/2024  -     Cancel: Microalbumin/Creatinine Ratio, Urine; Future; Expected date: 10/11/2024  -     Diabetic Eye Screening Photo; Future; Expected date: 10/11/2024    2. Hypothyroidism, unspecified type  Assessment & Plan:  Continue Synthroid 100 mcg while see TSH is pending    Orders:  -     TSH  -     T4    3. Fatigue, unspecified type  -     Testosterone  -     TSH  -     T4    4. Hyperlipidemia, unspecified hyperlipidemia type  Assessment & Plan:  Stressed importance of dietary modifications. Follow a low cholesterol, low saturated fat diet with less that 200mg of cholesterol a day.  Avoid fried foods and high saturated fats (high saturated fats less than 7% of calories).  Add Flax Seed/Fish Oil supplements to diet. Increase dietary fiber.  Regular exercise can reduce LDL and raise HDL. Stressed importance of physical activity 5 times per week for 30 minutes per day.       continue lovastatin 20 mg increased at last office visit, FLP today      Orders:  -     Lipid Panel    5. History of gout  -     Uric Acid    6. Primary hypertension  Assessment & Plan:  Low Sodium Diet (Dash Diet - less than 2 grams of sodium per day).  Monitor Blood Pressure daily and log. Report any consistent numbers greater than 140/90.  Smoking Cessation encouraged to aid in BP reduction.  Maintain healthy weight with goal BMI <30. Exercise 30 minutes per day 5 days per week    Chronic stable, continue losartan 100 mg daily      7. Type 2 diabetes mellitus with other specified complication, unspecified whether long term insulin use  Overview:  >>OVERVIEW FOR TYPE 2 DIABETES MELLITUS WITH  HYPERGLYCEMIA WRITTEN ON 4/11/2024  1:36 PM BY SAMMIE OCONNOR NP    Encouraged ACE/ARB/Statin according to guidelines.  Follow ADA Diet. Avoid soda, simple sweets, and limit rice/pasta/breads/starches.  Maintain healthy weight with goal BMI <30. Exercise 5 times per week for 30 minutes per day.  Stressed importance of daily foot exams.  Stressed importance of annual dilated eye exam.  Last foot exam: 7/15/24  Last eye exam: 10/10/24  Last micro albumin: 4/11/24      Assessment & Plan:  Continue metformin 1000 mg daily, continue ozempic 2 mg weekly, recheck in 3 months           Follow up in about 3 months (around 1/11/2025) for routine labs recheck, follow up on fatigue.    Future Appointments   Date Time Provider Department Center   1/14/2025  1:40 PM Sid Resendiz, DAVIDP [facility]        Sammie Oconnor NP      This note was created with the assistance of a voice recognition software or phone dictation. There may be transcription errors as a result of using this technology however minimal. Effort has been made to assure accuracy of transcription but any obvious errors or omissions should be clarified with the author of the document

## 2024-10-14 ENCOUNTER — PATIENT MESSAGE (OUTPATIENT)
Dept: FAMILY MEDICINE | Facility: CLINIC | Age: 54
End: 2024-10-14
Payer: COMMERCIAL

## 2024-10-14 DIAGNOSIS — E03.9 HYPOTHYROIDISM, UNSPECIFIED TYPE: ICD-10-CM

## 2024-10-14 DIAGNOSIS — R53.83 FATIGUE, UNSPECIFIED TYPE: Primary | ICD-10-CM

## 2024-10-14 RX ORDER — LEVOTHYROXINE SODIUM 100 UG/1
100 TABLET ORAL EVERY MORNING
Qty: 90 TABLET | Refills: 1 | Status: SHIPPED | OUTPATIENT
Start: 2024-10-14 | End: 2025-04-12

## 2025-02-11 ENCOUNTER — OFFICE VISIT (OUTPATIENT)
Dept: URGENT CARE | Facility: CLINIC | Age: 55
End: 2025-02-11
Payer: COMMERCIAL

## 2025-02-11 VITALS
HEART RATE: 78 BPM | DIASTOLIC BLOOD PRESSURE: 92 MMHG | RESPIRATION RATE: 20 BRPM | HEIGHT: 71 IN | SYSTOLIC BLOOD PRESSURE: 153 MMHG | TEMPERATURE: 98 F | WEIGHT: 315 LBS | BODY MASS INDEX: 44.1 KG/M2 | OXYGEN SATURATION: 96 %

## 2025-02-11 DIAGNOSIS — L02.91 ABSCESS: Primary | ICD-10-CM

## 2025-02-11 PROCEDURE — 99203 OFFICE O/P NEW LOW 30 MIN: CPT | Mod: ,,, | Performed by: CLINIC/CENTER

## 2025-02-11 RX ORDER — SEMAGLUTIDE 2.68 MG/ML
2 INJECTION, SOLUTION SUBCUTANEOUS
COMMUNITY
Start: 2024-12-07

## 2025-02-11 RX ORDER — SULFAMETHOXAZOLE AND TRIMETHOPRIM 800; 160 MG/1; MG/1
1 TABLET ORAL 2 TIMES DAILY
Qty: 14 TABLET | Refills: 0 | Status: SHIPPED | OUTPATIENT
Start: 2025-02-11 | End: 2025-02-18

## 2025-02-12 NOTE — PATIENT INSTRUCTIONS
Wound Care:  Keep area clean with soap and water twice a day.  You can use a warm compress to promote drainage 4 times a day for 50 minutes at a time  Pain: Take OTC Tylenol or Ibuprofen per package instructions as needed for pain.  Loosen the bandage if needed.   Follow up with your Primary Care Provider within 3-5 days for a recheck.   Present to the Emergency Department for any significant change or worsening symptoms including worsening redness, swelling, purulent discharge, fever, body aches, or chills.

## 2025-02-12 NOTE — PROGRESS NOTES
"Subjective:      Patient ID: Freeman Louis is a 54 y.o. male.    Vitals:  height is 5' 11" (1.803 m) and weight is 152.9 kg (337 lb) (abnormal). His tympanic temperature is 98.3 °F (36.8 °C). His blood pressure is 153/92 (abnormal) and his pulse is 78. His respiration is 20 and oxygen saturation is 96%.     Chief Complaint: Otalgia     Patient is a 54 y.o. male who presents to urgent care with complaints of right ear pain x 1 days. Patient denies fever.  Patient states that his wife saw a pimple on his right tragus yesterday.  His wife who is an LPN squeezed used the tragus and expressed purulent drainage.      Skin:  Positive for abscess.      Objective:     Physical Exam   Constitutional: He is oriented to person, place, and time. He appears well-developed. He is cooperative.  Non-toxic appearance. He does not appear ill. No distress.   HENT:   Head: Normocephalic and atraumatic.   Ears:   Right Ear: Hearing, tympanic membrane and ear canal normal.   Left Ear: Hearing, tympanic membrane, external ear and ear canal normal.      Comments: Right tragus is mildly swollen and reddened.  There is no mastoiditis.  Inner ear canal and TM are normal.  The rest of the external ear canal is normal.  There is a small pin point area of drainage on the tragus.  There is no fluctuance noted  Nose: Nose normal. No mucosal edema, rhinorrhea or nasal deformity. No epistaxis. Right sinus exhibits no maxillary sinus tenderness and no frontal sinus tenderness. Left sinus exhibits no maxillary sinus tenderness and no frontal sinus tenderness.   Mouth/Throat: Uvula is midline, oropharynx is clear and moist and mucous membranes are normal. No trismus in the jaw. Normal dentition. No uvula swelling. No oropharyngeal exudate, posterior oropharyngeal edema or posterior oropharyngeal erythema.   Eyes: Conjunctivae and lids are normal. No scleral icterus.   Neck: Trachea normal and phonation normal. Neck supple. No edema present. No " "erythema present. No neck rigidity present.   Cardiovascular: Normal rate, regular rhythm, normal heart sounds and normal pulses.   Pulmonary/Chest: Effort normal and breath sounds normal. No respiratory distress. He has no decreased breath sounds. He has no rhonchi.   Abdominal: Normal appearance.   Musculoskeletal: Normal range of motion.         General: No deformity. Normal range of motion.   Neurological: He is alert and oriented to person, place, and time. He exhibits normal muscle tone. Coordination normal.   Skin: Skin is warm, dry, intact, not diaphoretic and not pale.   Psychiatric: His speech is normal and behavior is normal. Judgment and thought content normal.   Nursing note and vitals reviewed.         Previous History      Review of patient's allergies indicates:  No Known Allergies    Past Medical History:   Diagnosis Date    Diabetes mellitus, type 2     Hypertension      Current Outpatient Medications   Medication Instructions    allopurinoL (ZYLOPRIM) 100 MG tablet Take one tablet by mouth daily    levothyroxine (SYNTHROID) 100 mcg, Oral, Every morning    losartan (COZAAR) 100 mg, Oral, Daily    lovastatin (MEVACOR) 20 mg, Oral, Nightly    metFORMIN (GLUCOPHAGE) 1,000 mg, Oral, Daily    OZEMPIC 2 mg, Every 7 days    XHANCE 93 mcg/actuation AerB 1 spray, 2 times daily     Past Surgical History:   Procedure Laterality Date    APPENDECTOMY       No family history on file.    Social History     Tobacco Use    Smoking status: Never    Smokeless tobacco: Never   Substance Use Topics    Alcohol use: Yes     Comment: Occ    Drug use: Never        Physical Exam      Vital Signs Reviewed   BP (!) 153/92   Pulse 78   Temp 98.3 °F (36.8 °C) (Tympanic)   Resp 20   Ht 5' 11" (1.803 m)   Wt (!) 152.9 kg (337 lb)   SpO2 96%   BMI 47.00 kg/m²        Procedures    Procedures     Labs     Results for orders placed or performed in visit on 10/11/24   Hemoglobin A1C, POCT    Collection Time: 10/11/24  8:04 AM "   Result Value Ref Range    Hemoglobin A1C, POC 7.2 %   Testosterone    Collection Time: 10/11/24  8:30 AM   Result Value Ref Range    Testosterone Total 294.78 220.91 - 715.81 ng/dL   TSH    Collection Time: 10/11/24  8:30 AM   Result Value Ref Range    TSH 2.718 0.350 - 4.940 uIU/mL   Lipid Panel    Collection Time: 10/11/24  8:30 AM   Result Value Ref Range    Cholesterol Total 188 <=200 mg/dL    HDL Cholesterol 49 35 - 60 mg/dL    Triglyceride 139 34 - 140 mg/dL    Cholesterol/HDL Ratio 4 0 - 5    Very Low Density Lipoprotein 28     LDL Cholesterol 111.00 50.00 - 140.00 mg/dL   Uric Acid    Collection Time: 10/11/24  8:30 AM   Result Value Ref Range    Uric Acid 8.1 (H) 3.5 - 7.2 mg/dL   T4 (In-House)    Collection Time: 10/11/24  8:30 AM   Result Value Ref Range    Thyroxine 7.38 4.87 - 11.72 ug/dL      Assessment:     1. Abscess      I will defer incision and drainage at this time since patient's wound is already draining.  Patient has an extensive medical history including diabetes and hypertension.  I will place him on antibiotics to cover for MRSA  Plan:   Wound Care:  Keep area clean with soap and water twice a day.  You can use a warm compress to promote drainage 4 times a day for 50 minutes at a time  Pain: Take OTC Tylenol or Ibuprofen per package instructions as needed for pain.  Loosen the bandage if needed.   Follow up with your Primary Care Provider within 3-5 days for a recheck.   Present to the Emergency Department for any significant change or worsening symptoms including worsening redness, swelling, purulent discharge, fever, body aches, or chills.     Abscess

## 2025-03-17 DIAGNOSIS — E78.5 HYPERLIPIDEMIA, UNSPECIFIED HYPERLIPIDEMIA TYPE: ICD-10-CM

## 2025-03-17 RX ORDER — LOVASTATIN 20 MG/1
20 TABLET ORAL NIGHTLY
Qty: 90 TABLET | Refills: 1 | Status: SHIPPED | OUTPATIENT
Start: 2025-03-17 | End: 2025-09-13

## 2025-04-07 ENCOUNTER — PATIENT OUTREACH (OUTPATIENT)
Facility: CLINIC | Age: 55
End: 2025-04-07
Payer: COMMERCIAL

## 2025-04-07 DIAGNOSIS — I10 PRIMARY HYPERTENSION: ICD-10-CM

## 2025-04-07 DIAGNOSIS — E03.9 HYPOTHYROIDISM, UNSPECIFIED TYPE: ICD-10-CM

## 2025-04-07 DIAGNOSIS — E11.65 TYPE 2 DIABETES MELLITUS WITH HYPERGLYCEMIA, WITHOUT LONG-TERM CURRENT USE OF INSULIN: Chronic | ICD-10-CM

## 2025-04-07 RX ORDER — LEVOTHYROXINE SODIUM 100 UG/1
100 TABLET ORAL EVERY MORNING
Qty: 90 TABLET | Refills: 1 | Status: SHIPPED | OUTPATIENT
Start: 2025-04-07 | End: 2025-10-04

## 2025-04-07 RX ORDER — METFORMIN HYDROCHLORIDE 1000 MG/1
1000 TABLET ORAL DAILY
Qty: 90 TABLET | Refills: 1 | Status: SHIPPED | OUTPATIENT
Start: 2025-04-07

## 2025-04-07 RX ORDER — LOSARTAN POTASSIUM 100 MG/1
100 TABLET ORAL DAILY
Qty: 90 TABLET | Refills: 1 | Status: SHIPPED | OUTPATIENT
Start: 2025-04-07 | End: 2025-10-04

## 2025-04-07 NOTE — Clinical Note
Former [facility]io Feliciano pt. Losartan and metformin Rx have  Synthroid will exp 2025. Pt has self scheduled appt in portal stating he needs med refills.   Lab results from last ov were reviewed by former pcp and portal message was sent to pt on 10/14/2024 with question of uric acid & testosterone levels. Message was not read by pt/ no f/u.   Next F/U: 2025

## 2025-04-07 NOTE — LETTER
Freeman    We are reaching out to you about your blood pressure. When you were last seen, your blood pressure was higher than normal.     If you have a blood pressure monitor at home, please check your blood pressure and reply to this message with your blood pressure reading through your patient portal or give me a call at [phone]. The blood pressure reading will be sent to your provider and we will update your chart. I encourage you to please take your BP reading daily and keep a log to bring with you at your next visit.     Thank you for choosing [facility] & have a great day!     Sincerely,    SANJUANA Sal Care Coordinator  and your [facility] Team

## 2025-04-07 NOTE — TELEPHONE ENCOUNTER
----- Message from Nurse Sal sent at 2025  8:49 AM CDT -----  Former Julio Feliciano pt. Losartan and metformin Rx have  Synthroid will exp 2025. Pt has self scheduled appt in portal stating he needs med refills.     Lab results from last ov were reviewed by former pcp and portal message was sent to pt on 10/14/2024 with question of uric acid & testosterone levels. Message was not read by pt/ no f/u.     Next F/U: 2025

## 2025-04-07 NOTE — LETTER
Dear Brian [facility] is committed to your overall health. We have checked the health information in your chart to make sure you are up to date.     Our review of your chart shows that you may be due for     Colon Cancer Screening    If you have done any of these somewhere else, please let us know so we can update your chart.     You have an appointment on 6/2/2025 at 1:20 pm. If you have any questions, you may ask them at that time.    If you would like, I can help get this screening ordered for you (if needed) and also see if there is anything else we can do to help you. Please reply to this message in your patient portal or give me a call at [phone].      Thank you for choosing [facility] & have a great day!    SANJUANA Sal Care Coordinator  and your [facility] Team

## 2025-04-07 NOTE — PROGRESS NOTES
Care Coordination Encounter Details:       MyChart Portal Status:         [x]  Reviewed MyChart Portal Status offered / enrolled if applicable        Additional Notes:     MyChart Outcomes: Pt is enrolled & active          Updates Requested / Reviewed:        Updated Care Coordination Note, Care Everywhere, , and Immunizations Reconciliation Completed or Queried: [place]         Health Maintenance Screening(s) Due:      Health Maintenance Topics Overdue:      VBHM Score: 2     Colon Cancer Screening  Uncontrolled BP              Health Maintenance Topic(s) Outreach Outcomes & Actions Taken:     Additional Notes:  Attempt made to contact patient. No answer. Message left with name and phone number for callback. Letter mailed.  Former [facility] pt. Lab results from last pcp f/u reviewed by former pcp and portal message sent to pt on 10/14/2024. Message has not been read by pt.   Next F/U: 2025  SDOH review/update needed- financial, transportation & food   Health Maintenance Topics Overdue:    VBHM Score: 2   Colon Cancer Screening- Colonoscopy referral to Yovani Mobley MD on 3/21/24 . Office was unable to contact.     Uncontrolled BP       HTN: uncontrolled    DM: Last A1c 7.2 on 10/11/2024  Annual Kidney Eval: uACR- due 2025     Statin Therapy for prevention of CVD: Lovastatin         Chronic Disease Management:     Diabetes Measures        Lab Results   Component Value Date    HGBA1C 9.3 (H) 2024           [x]  Reviewed chart for active Diabetes diagnosis     []  Scheduled necessary follow up appointments if needed         Additional Notes:  POCT A1c on 10/11/2024: 7.2             Hypertension Measures        BP Readings from Last 1 Encounters:   25 (!) 153/92           [x]  Reviewed chart for active Hypertension diagnosis     []  Reviewed & documented Home BP Cuff     []  Documented a Remote BP if needed & applicable     []  Scheduled necessary follow up appointments  with Primary Care if needed         Additional Notes:  Letter mailed for remote BP if home monitor available.            Provider Team Continuity:     Last PCP Visit Date: 10/11/2024          [x]  Reviewed Primary Care Provider Visits, Annual Wellness Visit, and Future          Appointments to ensure appointments have been scheduled and/or           completed        Additional Notes:  Former NP brain Feliciano pt. Will see NP Deven at next f/u.           Social Determinants of Health          [x]  Reviewed, completed, and/or updated the following sections:                  Food Insecurity, Transportation Needs, Financial Resource Strain,                 Tobacco Use        Additional Notes:  Pt may benefit from OPCM/ CHW referral. Review/update of SDOH needed.            Care Management, Digital Medicine, and/or Education Referrals    OPCM Risk Score: 11.5         Next Steps - Referral Actions: Digital Medicine Outcomes and Actions Taken: Pt Declined or Not Eligible        Additional Notes:  Pt may benefit from OPCM referral for uncontrolled HTN.

## 2025-05-20 ENCOUNTER — OFFICE VISIT (OUTPATIENT)
Dept: FAMILY MEDICINE | Facility: CLINIC | Age: 55
End: 2025-05-20
Payer: COMMERCIAL

## 2025-05-20 VITALS
OXYGEN SATURATION: 96 % | WEIGHT: 315 LBS | HEIGHT: 71 IN | SYSTOLIC BLOOD PRESSURE: 125 MMHG | RESPIRATION RATE: 18 BRPM | DIASTOLIC BLOOD PRESSURE: 84 MMHG | BODY MASS INDEX: 44.1 KG/M2 | TEMPERATURE: 100 F | HEART RATE: 91 BPM

## 2025-05-20 DIAGNOSIS — I10 PRIMARY HYPERTENSION: ICD-10-CM

## 2025-05-20 DIAGNOSIS — E66.01 MORBID (SEVERE) OBESITY DUE TO EXCESS CALORIES: ICD-10-CM

## 2025-05-20 DIAGNOSIS — M10.9 GOUT OF MULTIPLE SITES, UNSPECIFIED CAUSE, UNSPECIFIED CHRONICITY: ICD-10-CM

## 2025-05-20 DIAGNOSIS — G47.31 CENTRAL SLEEP APNEA: ICD-10-CM

## 2025-05-20 DIAGNOSIS — E78.2 MIXED HYPERLIPIDEMIA: Chronic | ICD-10-CM

## 2025-05-20 DIAGNOSIS — E03.9 HYPOTHYROIDISM, UNSPECIFIED TYPE: ICD-10-CM

## 2025-05-20 DIAGNOSIS — E11.69 TYPE 2 DIABETES MELLITUS WITH OTHER SPECIFIED COMPLICATION, UNSPECIFIED WHETHER LONG TERM INSULIN USE: Chronic | ICD-10-CM

## 2025-05-20 DIAGNOSIS — T78.40XA ALLERGY, INITIAL ENCOUNTER: ICD-10-CM

## 2025-05-20 DIAGNOSIS — Z00.00 WELL ADULT EXAM: Primary | ICD-10-CM

## 2025-05-20 PROBLEM — M1A.0790 CHRONIC GOUT OF ANKLE: Status: ACTIVE | Noted: 2025-05-20

## 2025-05-20 LAB
ALBUMIN SERPL-MCNC: 3.9 G/DL (ref 3.5–5)
ALBUMIN/GLOB SERPL: 0.9 RATIO (ref 1.1–2)
ALP SERPL-CCNC: 89 UNIT/L (ref 40–150)
ALT SERPL-CCNC: 34 UNIT/L (ref 0–55)
ANION GAP SERPL CALC-SCNC: 10 MEQ/L
AST SERPL-CCNC: 21 UNIT/L (ref 11–45)
BACTERIA #/AREA URNS AUTO: ABNORMAL /HPF
BASOPHILS # BLD AUTO: 0.05 X10(3)/MCL
BASOPHILS NFR BLD AUTO: 0.4 %
BILIRUB SERPL-MCNC: 1 MG/DL
BILIRUB UR QL STRIP.AUTO: NEGATIVE
BUN SERPL-MCNC: 13.9 MG/DL (ref 8.4–25.7)
CALCIUM SERPL-MCNC: 9.8 MG/DL (ref 8.4–10.2)
CHLORIDE SERPL-SCNC: 104 MMOL/L (ref 98–107)
CHOLEST SERPL-MCNC: 237 MG/DL
CHOLEST/HDLC SERPL: 5 {RATIO} (ref 0–5)
CLARITY UR: CLEAR
CO2 SERPL-SCNC: 24 MMOL/L (ref 22–29)
COLOR UR AUTO: YELLOW
CREAT SERPL-MCNC: 0.93 MG/DL (ref 0.72–1.25)
CREAT/UREA NIT SERPL: 15
EOSINOPHIL # BLD AUTO: 0.23 X10(3)/MCL (ref 0–0.9)
EOSINOPHIL NFR BLD AUTO: 1.9 %
ERYTHROCYTE [DISTWIDTH] IN BLOOD BY AUTOMATED COUNT: 12.9 % (ref 11.5–17)
EST. AVERAGE GLUCOSE BLD GHB EST-MCNC: 243.2 MG/DL
GFR SERPLBLD CREATININE-BSD FMLA CKD-EPI: >60 ML/MIN/1.73/M2
GLOBULIN SER-MCNC: 4.3 GM/DL (ref 2.4–3.5)
GLUCOSE SERPL-MCNC: 235 MG/DL (ref 74–100)
GLUCOSE UR QL STRIP: ABNORMAL
HBA1C MFR BLD: 10.1 %
HCT VFR BLD AUTO: 43.5 % (ref 42–52)
HDLC SERPL-MCNC: 48 MG/DL (ref 35–60)
HGB BLD-MCNC: 15 G/DL (ref 14–18)
HGB UR QL STRIP: NEGATIVE
HYALINE CASTS #/AREA URNS LPF: ABNORMAL /LPF
IMM GRANULOCYTES # BLD AUTO: 0.03 X10(3)/MCL (ref 0–0.04)
IMM GRANULOCYTES NFR BLD AUTO: 0.3 %
KETONES UR QL STRIP: NEGATIVE
LDLC SERPL CALC-MCNC: 131 MG/DL (ref 50–140)
LEUKOCYTE ESTERASE UR QL STRIP: NEGATIVE
LYMPHOCYTES # BLD AUTO: 1.75 X10(3)/MCL (ref 0.6–4.6)
LYMPHOCYTES NFR BLD AUTO: 14.8 %
MCH RBC QN AUTO: 30.5 PG (ref 27–31)
MCHC RBC AUTO-ENTMCNC: 34.5 G/DL (ref 33–36)
MCV RBC AUTO: 88.6 FL (ref 80–94)
MONOCYTES # BLD AUTO: 1.38 X10(3)/MCL (ref 0.1–1.3)
MONOCYTES NFR BLD AUTO: 11.6 %
MUCOUS THREADS URNS QL MICRO: ABNORMAL /LPF
NEUTROPHILS # BLD AUTO: 8.42 X10(3)/MCL (ref 2.1–9.2)
NEUTROPHILS NFR BLD AUTO: 71 %
NITRITE UR QL STRIP: NEGATIVE
NRBC BLD AUTO-RTO: 0 %
PH UR STRIP: 5.5 [PH]
PLATELET # BLD AUTO: 225 X10(3)/MCL (ref 130–400)
PMV BLD AUTO: 11.3 FL (ref 7.4–10.4)
POTASSIUM SERPL-SCNC: 4 MMOL/L (ref 3.5–5.1)
PROT SERPL-MCNC: 8.2 GM/DL (ref 6.4–8.3)
PROT UR QL STRIP: ABNORMAL
PSA SERPL-MCNC: 0.22 NG/ML
RBC # BLD AUTO: 4.91 X10(6)/MCL (ref 4.7–6.1)
RBC #/AREA URNS AUTO: ABNORMAL /HPF
SODIUM SERPL-SCNC: 138 MMOL/L (ref 136–145)
SP GR UR STRIP.AUTO: 1.03 (ref 1–1.03)
SQUAMOUS #/AREA URNS LPF: ABNORMAL /HPF
T4 FREE SERPL-MCNC: 0.84 NG/DL (ref 0.7–1.48)
TRIGL SERPL-MCNC: 291 MG/DL (ref 34–140)
TSH SERPL-ACNC: 2.88 UIU/ML (ref 0.35–4.94)
URATE SERPL-MCNC: 8 MG/DL (ref 3.5–7.2)
UROBILINOGEN UR STRIP-ACNC: NORMAL
VLDLC SERPL CALC-MCNC: 58 MG/DL
WBC # BLD AUTO: 11.86 X10(3)/MCL (ref 4.5–11.5)
WBC #/AREA URNS AUTO: ABNORMAL /HPF

## 2025-05-20 PROCEDURE — 84153 ASSAY OF PSA TOTAL: CPT | Performed by: NURSE PRACTITIONER

## 2025-05-20 PROCEDURE — 81001 URINALYSIS AUTO W/SCOPE: CPT | Performed by: NURSE PRACTITIONER

## 2025-05-20 PROCEDURE — 99214 OFFICE O/P EST MOD 30 MIN: CPT | Mod: PBBFAC,PN | Performed by: NURSE PRACTITIONER

## 2025-05-20 PROCEDURE — 83036 HEMOGLOBIN GLYCOSYLATED A1C: CPT | Performed by: NURSE PRACTITIONER

## 2025-05-20 PROCEDURE — 84439 ASSAY OF FREE THYROXINE: CPT | Performed by: NURSE PRACTITIONER

## 2025-05-20 PROCEDURE — 80061 LIPID PANEL: CPT | Performed by: NURSE PRACTITIONER

## 2025-05-20 PROCEDURE — 80053 COMPREHEN METABOLIC PANEL: CPT | Performed by: NURSE PRACTITIONER

## 2025-05-20 PROCEDURE — 84443 ASSAY THYROID STIM HORMONE: CPT | Performed by: NURSE PRACTITIONER

## 2025-05-20 PROCEDURE — 84550 ASSAY OF BLOOD/URIC ACID: CPT | Performed by: NURSE PRACTITIONER

## 2025-05-20 PROCEDURE — G0444 DEPRESSION SCREEN ANNUAL: HCPCS | Mod: 59,PBBFAC,PN | Performed by: NURSE PRACTITIONER

## 2025-05-20 PROCEDURE — 85025 COMPLETE CBC W/AUTO DIFF WBC: CPT | Performed by: NURSE PRACTITIONER

## 2025-05-20 RX ORDER — FLUTICASONE PROPIONATE 50 MCG
1 SPRAY, SUSPENSION (ML) NASAL DAILY
Qty: 16 G | Refills: 1 | Status: SHIPPED | OUTPATIENT
Start: 2025-05-20

## 2025-05-20 RX ORDER — MONTELUKAST SODIUM 5 MG/1
5 TABLET, CHEWABLE ORAL NIGHTLY
Qty: 30 TABLET | Refills: 0 | Status: SHIPPED | OUTPATIENT
Start: 2025-05-20 | End: 2025-06-19

## 2025-05-20 RX ORDER — ALLOPURINOL 100 MG/1
TABLET ORAL
Qty: 90 TABLET | Refills: 2 | Status: SHIPPED | OUTPATIENT
Start: 2025-05-20

## 2025-05-20 NOTE — ASSESSMENT & PLAN NOTE
- Pt wellness visit completed today with appropriate lab work.   - HM Topics Reviewed / Updated  - Immunizations Discussed  - Dicussed Healthy Diet  - Encouraged to exercise 3 x weekly  - Increase Water Intake  - Eat more fruits and vegetables  - Avoid soda & alcohol  - PHQ-2 score: 1

## 2025-05-20 NOTE — ASSESSMENT & PLAN NOTE
- Stable    Lab Results   Component Value Date    CHOL 188 10/11/2024     Lab Results   Component Value Date    HDL 49 10/11/2024     Lab Results   Component Value Date    TRIG 139 10/11/2024     Lab Results   Component Value Date    VLDL 28 10/11/2024     Lab Results   Component Value Date    .00 10/11/2024       - The 10-year ASCVD risk score (Davonte SMITH, et al., 2019) is: 12.2%    Values used to calculate the score:      Age: 54 years      Sex: Male      Is Non- : No      Diabetic: Yes      Tobacco smoker: No      Systolic Blood Pressure: 125 mmHg      Is BP treated: Yes      HDL Cholesterol: 49 mg/dL      Total Cholesterol: 188 mg/dL   - diet controlled  - Advised to implement a heart-healthy diet emphasizing fruits, vegetables, whole grains, poultry, fish, nuts, and nontropical vegetable oils, while limiting red and processed meats, sodium, and sugar-sweetened foods and beverages. Avoid fried foods and high saturated fats (ferraro, sausage, cookies, cakes, chips, cheese, whole milk, butter, mayonnaise, creamy dressings, gravy, stew, gumbo, boudin, cracklins and cream sauces).  - Add flax seed or fish oil supplements to diet.   - Increase dietary fiber.   - Regular exercise improves cholesterol levels.  - Physical activity 5 times a week for 30 minutes per day (or 150 minutes per week).

## 2025-05-20 NOTE — ASSESSMENT & PLAN NOTE
- managed, labs ordered  - Continue levothyroxine as ordered  - Take medication consistently in the morning on an empty stomach, at least 30 to 60 minutes before food. Alternatively, may consistently administer at night 3 to 4 hours after the last meal. Do not administer within 4 hours of calcium or iron containing products or bile acid sequestrants.   - Advised of over treatment symptoms including palpitations, tachycardia, LONG, cardiac arrhythmias   - Importance of having thyroid (TSH) lab levels monitored regularly stressed   Inflammation Suggestive Of Cancer Camouflage Histology Text: There was a dense lymphocytic infiltrate which prevented adequate histologic evaluation of adjacent structures.

## 2025-05-20 NOTE — ASSESSMENT & PLAN NOTE
- Body mass index is 47.25 kg/m².  - Continue ozempic  - Exercise (30min/day for 5days a week)   - Follow a balanced low-calorie, low-fat/low-calorie, moderate-fat/low-calorie, or low-carbohydrate diets  - Obesity is a serious, chronic, and progressive disease and is associated with a significant increase in mortality and many health risks including type 2 diabetes mellitus, hypertension, dyslipidemia, and coronary heart disease. The benefits of weight loss include a reduction in the rate of progression from impaired glucose tolerance to diabetes, blood pressure in hypertensive patients, and lipid levels in higher risk patients. Other noncardiac benefits of weight loss includes reductions in urinary incontinence, sleep apnea, and depression, as well as improvements in quality of life, physical functioning, and mobility.

## 2025-05-20 NOTE — PROGRESS NOTES
[facility]    Patient ID: [id number]     Chief Complaint: Follow-up (F/u visit/Refills on allopurinoL (ZYLOPRIM) 100 MG tablet)      HPI:   HPI    Freeman Louis is a 54 y.o. male here today for Follow-up (F/u visit/Refills on allopurinoL (ZYLOPRIM) 100 MG tablet)      History of Present Illness     05/20/2025: Patient presents to clinic today to establish care with new provider in clinic and wellness visit.  Current medical problems include diabetes, hypertension, hyperlipidemia, hypothyroidism, gout and NINFA treated with BiPAP.  We will order routine wellness labs. Has been out of his allopurinol for couple of months. No acute flare-ups. Will refill. Discussed need for colon cancer screening.  Patient elects to have colonoscopy.  Agrees for referral to be sent to [facility].    Reports increase in allergy symptoms (nasal congestion, runny eyes.) Xhance was ordered but patient was unable to continue due to price. Has not attempted any OTC medications. Denies any fever, SOB.      Past Medical History:   Diagnosis Date    Diabetes mellitus, type 2     Hypertension         Past Surgical History:   Procedure Laterality Date    APPENDECTOMY          Social History     Tobacco Use    Smoking status: Never    Smokeless tobacco: Never   Substance and Sexual Activity    Alcohol use: Yes     Comment: Occ    Drug use: Never    Sexual activity: Yes        Current Outpatient Medications   Medication Instructions    allopurinoL (ZYLOPRIM) 100 MG tablet Take one tablet by mouth daily    fluticasone propionate (FLONASE) 50 mcg, Each Nostril, Daily    levothyroxine (SYNTHROID) 100 mcg, Oral, Every morning    losartan (COZAAR) 100 mg, Oral, Daily    lovastatin (MEVACOR) 20 mg, Oral, Nightly    metFORMIN (GLUCOPHAGE) 1,000 mg, Oral, Daily    montelukast (SINGULAIR) 5 mg, Oral, Nightly    OZEMPIC 2 mg, Every 7 days       Review of patient's allergies indicates:  No Known Allergies     Patient Care Team:  Elvin  "COLUMBA De Jesus as PCP - General (Family Medicine)  Jonelle Khoury LPN as Care Coordinator     Subjective:     Review of Systems   Constitutional:  Negative for appetite change, chills, diaphoresis and fever.   HENT:  Negative for ear pain, sinus pain and sore throat.    Eyes:  Negative for pain and visual disturbance.   Respiratory:  Negative for cough, shortness of breath and wheezing.    Cardiovascular:  Negative for chest pain, palpitations and leg swelling.   Gastrointestinal:  Negative for abdominal pain, blood in stool, diarrhea, nausea and vomiting.   Endocrine: Negative for cold intolerance.   Genitourinary:  Negative for difficulty urinating, dysuria, frequency and hematuria.   Musculoskeletal:  Negative for arthralgias, joint swelling and myalgias.   Skin:  Negative for color change and rash.   Allergic/Immunologic: Negative.    Neurological: Negative.  Negative for dizziness, syncope, light-headedness and numbness.   Hematological: Negative.    Psychiatric/Behavioral: Negative.  Negative for dysphoric mood and suicidal ideas. The patient is not nervous/anxious.    All other systems reviewed and are negative.      12 point review of systems conducted, negative except as stated in the history of present illness. See HPI for details.    Objective:     Visit Vitals  /84 (BP Location: Right arm, Patient Position: Sitting)   Pulse 91   Temp 99.7 °F (37.6 °C) (Oral)   Resp 18   Ht 5' 11" (1.803 m)   Wt (!) 153.7 kg (338 lb 12.8 oz)   SpO2 96%   BMI 47.25 kg/m²       Physical Exam  Vitals and nursing note reviewed.   Constitutional:       General: He is not in acute distress.     Appearance: He is not ill-appearing.   HENT:      Head: Normocephalic and atraumatic.      Mouth/Throat:      Mouth: Mucous membranes are moist.      Pharynx: Oropharynx is clear.   Eyes:      General: No scleral icterus.     Extraocular Movements: Extraocular movements intact.      Conjunctiva/sclera: Conjunctivae normal.      " Pupils: Pupils are equal, round, and reactive to light.   Neck:      Vascular: No carotid bruit.   Cardiovascular:      Rate and Rhythm: Normal rate and regular rhythm.      Heart sounds: No murmur heard.     No friction rub. No gallop.   Pulmonary:      Effort: Pulmonary effort is normal. No respiratory distress.      Breath sounds: Normal breath sounds. No wheezing, rhonchi or rales.   Abdominal:      General: Abdomen is flat. Bowel sounds are normal. There is no distension.      Palpations: Abdomen is soft. There is no mass.      Tenderness: There is no abdominal tenderness.   Musculoskeletal:         General: Normal range of motion.      Cervical back: Normal range of motion and neck supple.   Skin:     General: Skin is warm and dry.   Neurological:      General: No focal deficit present.      Mental Status: He is alert.   Psychiatric:         Mood and Affect: Mood normal.         Labs Reviewed:     Chemistry:  Lab Results   Component Value Date     04/11/2024    K 4.4 04/11/2024    BUN 11.4 04/11/2024    CREATININE 1.09 04/11/2024    EGFRNORACEVR >60 04/11/2024    CALCIUM 10.1 04/11/2024    ALKPHOS 93 04/11/2024    ALBUMIN 3.9 04/11/2024    BILIDIR 0.2 08/13/2021    IBILI 0.60 08/13/2021    AST 35 (H) 04/11/2024    ALT 41 04/11/2024    TSH 2.718 10/11/2024    HVIRKG2GJFK 1.08 02/13/2023    PSA 0.25 02/13/2023        Lab Results   Component Value Date    HGBA1C 9.3 (H) 04/11/2024        Hematology:  Lab Results   Component Value Date    WBC 7.8 02/13/2023    HGB 15.5 02/13/2023    HCT 45.5 02/13/2023     02/13/2023       Lipid Panel:  Lab Results   Component Value Date    CHOL 188 10/11/2024    HDL 49 10/11/2024    .00 10/11/2024    TRIG 139 10/11/2024    TOTALCHOLEST 4 10/11/2024        Urine:  Lab Results   Component Value Date    APPEARANCEUA Clear 05/10/2021    PROTEINUA Negative 05/10/2021    LEUKOCYTESUR Negative 05/10/2021    RBCUA 0-2 05/10/2021    WBCUA 0-2 05/10/2021    BACTERIA  None Seen 05/10/2021    SQEPUA 1-2 05/10/2021    HYALINECASTS 0-2 (A) 05/10/2021    CREATRANDUR 107.8 04/11/2024        Assessment:       ICD-10-CM ICD-9-CM   1. Well adult exam  Z00.00 V70.0   2. Primary hypertension  I10 401.9   3. Mixed hyperlipidemia  E78.2 272.2   4. Type 2 diabetes mellitus with other specified complication, unspecified whether long term insulin use  E11.69 250.80   5. Morbid (severe) obesity due to excess calories  E66.01 278.01   6. Hypothyroidism, unspecified type  E03.9 244.9   7. Gout of multiple sites, unspecified cause, unspecified chronicity  M10.9 274.9   8. Central sleep apnea  G47.31 780.57   9. Allergy, initial encounter  T78.40XA 995.3        Plan:     1. Well adult exam  Assessment & Plan:  - Pt wellness visit completed today with appropriate lab work.   -  Topics Reviewed / Updated  - Immunizations Discussed  - Dicussed Healthy Diet  - Encouraged to exercise 3 x weekly  - Increase Water Intake  - Eat more fruits and vegetables  - Avoid soda & alcohol  - PHQ-2 score: 1     Orders:  -     CBC Auto Differential  -     Comprehensive Metabolic Panel  -     Lipid Panel  -     TSH  -     Hemoglobin A1C  -     Urinalysis  -     PSA, Screening  -     Ambulatory referral/consult to Gastroenterology; Future; Expected date: 05/27/2025    2. Primary hypertension  Assessment & Plan:  - Disease state: Stable  - BP: 125/84  - Continue  Hypertension Medications              losartan (COZAAR) 100 MG tablet Take 1 tablet (100 mg total) by mouth once daily.        - Low Sodium Diet (Dash Diet - less than 2 grams of sodium per day).  - Maintain healthy weight with goal BMI <30. Exercise 30 minutes per day 5 days per week.  - Patient encouraged to monitor BP every day 2-3 hours after medications and record.  - Report to PCP if BP > 140/90 x3 days  - Instructed to hold any medications that will lower BP if SBP <120 prior to medications. Report to PCP if has to hold BP medication >2 doses in a  row.    Orders:  -     CBC Auto Differential  -     Comprehensive Metabolic Panel  -     Lipid Panel    3. Mixed hyperlipidemia  Assessment & Plan:  - Stable    Lab Results   Component Value Date    CHOL 188 10/11/2024     Lab Results   Component Value Date    HDL 49 10/11/2024     Lab Results   Component Value Date    TRIG 139 10/11/2024     Lab Results   Component Value Date    VLDL 28 10/11/2024     Lab Results   Component Value Date    .00 10/11/2024       - The 10-year ASCVD risk score (Davonte SMITH, et al., 2019) is: 12.2%    Values used to calculate the score:      Age: 54 years      Sex: Male      Is Non- : No      Diabetic: Yes      Tobacco smoker: No      Systolic Blood Pressure: 125 mmHg      Is BP treated: Yes      HDL Cholesterol: 49 mg/dL      Total Cholesterol: 188 mg/dL   - diet controlled  - Advised to implement a heart-healthy diet emphasizing fruits, vegetables, whole grains, poultry, fish, nuts, and nontropical vegetable oils, while limiting red and processed meats, sodium, and sugar-sweetened foods and beverages. Avoid fried foods and high saturated fats (ferraro, sausage, cookies, cakes, chips, cheese, whole milk, butter, mayonnaise, creamy dressings, gravy, stew, gumbo, boudin, cracklins and cream sauces).  - Add flax seed or fish oil supplements to diet.   - Increase dietary fiber.   - Regular exercise improves cholesterol levels.  - Physical activity 5 times a week for 30 minutes per day (or 150 minutes per week).     Orders:  -     Lipid Panel    4. Type 2 diabetes mellitus with other specified complication, unspecified whether long term insulin use  Overview:  Last foot exam: 7/15/24  Last eye exam: 10/10/24  Last micro albumin: 4/11/24, ordered    Lab Results   Component Value Date    HGBA1C 9.3 (H) 04/11/2024    HGBA1C 7.4 (H) 05/15/2023    HGBA1C 10.9 (H) 02/13/2023    HCWODWT8F 7.2 10/11/2024    EILGHAE1Y 7.5 07/15/2024    XKYCBBO5V 8.5 11/16/2023         Assessment & Plan:  - Managed    Diabetes Medications              metFORMIN (GLUCOPHAGE) 1000 MG tablet Take 1 tablet (1,000 mg total) by mouth once daily.    OZEMPIC 2 mg/dose (8 mg/3 mL) PnIj Inject 2 mg into the skin every 7 days.        - Not on ACE and Statin according to guidelines. Discussed with patient possible start based on lab results.  - Monitoring CBG does not monitor; encouraged to do so  - Patient is aware of hypoglycemia signs and symptoms.   - Has hypoglycemia emergency plan and glucose medications such as tablets, paste on hand  - Discussed the importance of A1C lab work, yearly Diabetic Eye Exams and daily self foot exams  - DIET: Avoid sugary drinks, processed food, limit carbohydrate intake, eat complex meals, portion control.  - EXERCISES: Advised if able, 150 min/week of moderate exercise (like brisk walking), performed during 3 to 5 sessions per week. Resistance exercise that uses the major muscle groups is recommended 2 to 3 times per week.?  - WEIGHT LOSS: 5-7% of body weight is recommended through calorie restriction, diet, and exercises with goal BMI <30.    Orders:  -     Hemoglobin A1C    5. Morbid (severe) obesity due to excess calories  Overview:  Wt Readings from Last 3 Encounters:   05/20/25 (!) 153.7 kg (338 lb 12.8 oz)   02/11/25 (!) 152.9 kg (337 lb)   10/11/24 (!) 154 kg (339 lb 6.4 oz)       Assessment & Plan:  - Body mass index is 47.25 kg/m².  - Continue ozempic  - Exercise (30min/day for 5days a week)   - Follow a balanced low-calorie, low-fat/low-calorie, moderate-fat/low-calorie, or low-carbohydrate diets  - Obesity is a serious, chronic, and progressive disease and is associated with a significant increase in mortality and many health risks including type 2 diabetes mellitus, hypertension, dyslipidemia, and coronary heart disease. The benefits of weight loss include a reduction in the rate of progression from impaired glucose tolerance to diabetes, blood pressure in  hypertensive patients, and lipid levels in higher risk patients. Other noncardiac benefits of weight loss includes reductions in urinary incontinence, sleep apnea, and depression, as well as improvements in quality of life, physical functioning, and mobility.      6. Hypothyroidism, unspecified type  Assessment & Plan:  - managed, labs ordered  - Continue levothyroxine as ordered  - Take medication consistently in the morning on an empty stomach, at least 30 to 60 minutes before food. Alternatively, may consistently administer at night 3 to 4 hours after the last meal. Do not administer within 4 hours of calcium or iron containing products or bile acid sequestrants.   - Advised of over treatment symptoms including palpitations, tachycardia, LONG, cardiac arrhythmias   - Importance of having thyroid (TSH) lab levels monitored regularly stressed    Orders:  -     TSH  -     T4, Free    7. Gout of multiple sites, unspecified cause, unspecified chronicity  Assessment & Plan:  - Managed    Lab Results   Component Value Date    LABURIC 8.1 (H) 10/11/2024     - Stay well hydrated by increasing water intake throughout the day.  - Stressed importance of exercise and weight loss to maintain BMI <30.  - Avoid alcohol, sodas, organ/glandular meats (liver, kidney, sweetbreads). Limit seafood and red meat intake.  - Eat a balanced diet of fruits, vegetables, complex carbohydrates, and lean sources of protein (boneless/skinless chicken breasts, salmon, lentils, low fat dairy).  - Discussed possible benefit of OTC Vitamin C supplementation.   - Continue allopurinol    Orders:  -     Uric Acid  -     allopurinoL (ZYLOPRIM) 100 MG tablet; Take one tablet by mouth daily  Dispense: 90 tablet; Refill: 2    8. Central sleep apnea  Assessment & Plan:  - uses bipap nightly  - symptoms controlled      9. Allergy, initial encounter  Assessment & Plan:  - has not attempted any OTC treatment  - previously prescribed xhance, unable to continue  due to cost  - start montelukast and flonase  - Increase PO Fluids  - Avoid/limit triggers such as pollen, dust, molds, and pet dander.   - Avoid smoke, strong chemicals, cleaning products, perfumes/colognes.    Orders:  -     montelukast (SINGULAIR) 5 MG chewable tablet; Take 1 tablet (5 mg total) by mouth every evening.  Dispense: 30 tablet; Refill: 0  -     fluticasone propionate (FLONASE) 50 mcg/actuation nasal spray; 1 spray (50 mcg total) by Each Nostril route once daily.  Dispense: 16 g; Refill: 1         No follow-ups on file. In addition to their scheduled follow up, the patient has also been instructed to follow up on as needed basis.     Future Appointments   Date Time Provider Department Center   6/3/2025  2:00 PM Millie Carvalho FNP [facility]           [facility]

## 2025-05-20 NOTE — ASSESSMENT & PLAN NOTE
- Managed    Lab Results   Component Value Date    LABURIC 8.1 (H) 10/11/2024     - Stay well hydrated by increasing water intake throughout the day.  - Stressed importance of exercise and weight loss to maintain BMI <30.  - Avoid alcohol, sodas, organ/glandular meats (liver, kidney, sweetbreads). Limit seafood and red meat intake.  - Eat a balanced diet of fruits, vegetables, complex carbohydrates, and lean sources of protein (boneless/skinless chicken breasts, salmon, lentils, low fat dairy).  - Discussed possible benefit of OTC Vitamin C supplementation.   - Continue allopurinol

## 2025-05-20 NOTE — ASSESSMENT & PLAN NOTE
- Disease state: Stable  - BP: 125/84  - Continue  Hypertension Medications              losartan (COZAAR) 100 MG tablet Take 1 tablet (100 mg total) by mouth once daily.        - Low Sodium Diet (Dash Diet - less than 2 grams of sodium per day).  - Maintain healthy weight with goal BMI <30. Exercise 30 minutes per day 5 days per week.  - Patient encouraged to monitor BP every day 2-3 hours after medications and record.  - Report to PCP if BP > 140/90 x3 days  - Instructed to hold any medications that will lower BP if SBP <120 prior to medications. Report to PCP if has to hold BP medication >2 doses in a row.

## 2025-05-20 NOTE — ASSESSMENT & PLAN NOTE
- Managed    Diabetes Medications              metFORMIN (GLUCOPHAGE) 1000 MG tablet Take 1 tablet (1,000 mg total) by mouth once daily.    OZEMPIC 2 mg/dose (8 mg/3 mL) PnIj Inject 2 mg into the skin every 7 days.        - Not on ACE and Statin according to guidelines. Discussed with patient possible start based on lab results.  - Monitoring CBG does not monitor; encouraged to do so  - Patient is aware of hypoglycemia signs and symptoms.   - Has hypoglycemia emergency plan and glucose medications such as tablets, paste on hand  - Discussed the importance of A1C lab work, yearly Diabetic Eye Exams and daily self foot exams  - DIET: Avoid sugary drinks, processed food, limit carbohydrate intake, eat complex meals, portion control.  - EXERCISES: Advised if able, 150 min/week of moderate exercise (like brisk walking), performed during 3 to 5 sessions per week. Resistance exercise that uses the major muscle groups is recommended 2 to 3 times per week.?  - WEIGHT LOSS: 5-7% of body weight is recommended through calorie restriction, diet, and exercises with goal BMI <30.

## 2025-05-20 NOTE — ASSESSMENT & PLAN NOTE
- has not attempted any OTC treatment  - previously prescribed xhance, unable to continue due to cost  - start montelukast and flonase  - Increase PO Fluids  - Avoid/limit triggers such as pollen, dust, molds, and pet dander.   - Avoid smoke, strong chemicals, cleaning products, perfumes/colognes.

## 2025-05-21 ENCOUNTER — RESULTS FOLLOW-UP (OUTPATIENT)
Dept: FAMILY MEDICINE | Facility: CLINIC | Age: 55
End: 2025-05-21

## 2025-06-03 ENCOUNTER — OFFICE VISIT (OUTPATIENT)
Dept: FAMILY MEDICINE | Facility: CLINIC | Age: 55
End: 2025-06-03
Payer: COMMERCIAL

## 2025-06-03 VITALS
BODY MASS INDEX: 44.1 KG/M2 | DIASTOLIC BLOOD PRESSURE: 82 MMHG | HEART RATE: 80 BPM | TEMPERATURE: 98 F | SYSTOLIC BLOOD PRESSURE: 128 MMHG | HEIGHT: 71 IN | WEIGHT: 315 LBS | OXYGEN SATURATION: 96 % | RESPIRATION RATE: 18 BRPM

## 2025-06-03 DIAGNOSIS — E66.01 MORBID (SEVERE) OBESITY DUE TO EXCESS CALORIES: ICD-10-CM

## 2025-06-03 DIAGNOSIS — E11.69 TYPE 2 DIABETES MELLITUS WITH OTHER SPECIFIED COMPLICATION, UNSPECIFIED WHETHER LONG TERM INSULIN USE: Primary | Chronic | ICD-10-CM

## 2025-06-03 DIAGNOSIS — E78.2 MIXED HYPERLIPIDEMIA: Chronic | ICD-10-CM

## 2025-06-03 PROBLEM — E11.65 TYPE 2 DIABETES MELLITUS WITH HYPERGLYCEMIA, WITHOUT LONG-TERM CURRENT USE OF INSULIN: Status: ACTIVE | Noted: 2022-01-29

## 2025-06-03 PROCEDURE — 99214 OFFICE O/P EST MOD 30 MIN: CPT | Mod: PBBFAC,PN | Performed by: NURSE PRACTITIONER

## 2025-06-03 RX ORDER — TIRZEPATIDE 2.5 MG/.5ML
2.5 INJECTION, SOLUTION SUBCUTANEOUS
Qty: 2 ML | Refills: 0 | Status: SHIPPED | OUTPATIENT
Start: 2025-06-03

## 2025-06-03 RX ORDER — TIRZEPATIDE 5 MG/.5ML
5 INJECTION, SOLUTION SUBCUTANEOUS
Qty: 2 ML | Refills: 3 | Status: SHIPPED | OUTPATIENT
Start: 2025-07-02

## 2025-07-10 ENCOUNTER — PATIENT MESSAGE (OUTPATIENT)
Facility: CLINIC | Age: 55
End: 2025-07-10
Payer: COMMERCIAL

## 2025-07-29 ENCOUNTER — TELEPHONE (OUTPATIENT)
Dept: FAMILY MEDICINE | Facility: CLINIC | Age: 55
End: 2025-07-29

## 2025-08-19 DIAGNOSIS — E11.69 TYPE 2 DIABETES MELLITUS WITH OTHER SPECIFIED COMPLICATION, UNSPECIFIED WHETHER LONG TERM INSULIN USE: Chronic | ICD-10-CM

## 2025-08-19 RX ORDER — TIRZEPATIDE 5 MG/.5ML
5 INJECTION, SOLUTION SUBCUTANEOUS
Qty: 2 ML | Refills: 0 | Status: SHIPPED | OUTPATIENT
Start: 2025-08-19

## 2025-08-29 ENCOUNTER — TELEPHONE (OUTPATIENT)
Dept: FAMILY MEDICINE | Facility: CLINIC | Age: 55
End: 2025-08-29
Payer: COMMERCIAL

## 2025-09-02 ENCOUNTER — CLINICAL SUPPORT (OUTPATIENT)
Dept: FAMILY MEDICINE | Facility: CLINIC | Age: 55
End: 2025-09-02
Payer: COMMERCIAL

## 2025-09-02 DIAGNOSIS — E11.69 TYPE 2 DIABETES MELLITUS WITH OTHER SPECIFIED COMPLICATION, UNSPECIFIED WHETHER LONG TERM INSULIN USE: Chronic | ICD-10-CM

## 2025-09-02 LAB
ALBUMIN SERPL-MCNC: 3.6 G/DL (ref 3.5–5)
ALBUMIN/CREAT UR: 113.9 MG/GM CR (ref 0–30)
ALBUMIN/GLOB SERPL: 0.8 RATIO (ref 1.1–2)
ALP SERPL-CCNC: 82 UNIT/L (ref 40–150)
ALT SERPL-CCNC: 50 UNIT/L (ref 0–55)
ANION GAP SERPL CALC-SCNC: 9 MEQ/L
AST SERPL-CCNC: 33 UNIT/L (ref 11–45)
BILIRUB SERPL-MCNC: 0.8 MG/DL
BUN SERPL-MCNC: 15 MG/DL (ref 8.4–25.7)
CALCIUM SERPL-MCNC: 9.5 MG/DL (ref 8.4–10.2)
CHLORIDE SERPL-SCNC: 101 MMOL/L (ref 98–107)
CO2 SERPL-SCNC: 27 MMOL/L (ref 22–29)
CREAT SERPL-MCNC: 1.1 MG/DL (ref 0.72–1.25)
CREAT UR-MCNC: 82.5 MG/DL (ref 63–166)
CREAT/UREA NIT SERPL: 14
EST. AVERAGE GLUCOSE BLD GHB EST-MCNC: 280.5 MG/DL
GFR SERPLBLD CREATININE-BSD FMLA CKD-EPI: >60 ML/MIN/1.73/M2
GLOBULIN SER-MCNC: 4.6 GM/DL (ref 2.4–3.5)
GLUCOSE SERPL-MCNC: 287 MG/DL (ref 74–100)
HBA1C MFR BLD: 11.4 %
MICROALBUMIN UR-MCNC: 94 UG/ML
POTASSIUM SERPL-SCNC: 4.3 MMOL/L (ref 3.5–5.1)
PROT SERPL-MCNC: 8.2 GM/DL (ref 6.4–8.3)
SODIUM SERPL-SCNC: 137 MMOL/L (ref 136–145)

## 2025-09-02 PROCEDURE — 99211 OFF/OP EST MAY X REQ PHY/QHP: CPT | Mod: PBBFAC,PN

## 2025-09-02 PROCEDURE — 83036 HEMOGLOBIN GLYCOSYLATED A1C: CPT

## 2025-09-02 PROCEDURE — 80053 COMPREHEN METABOLIC PANEL: CPT

## 2025-09-02 PROCEDURE — 82043 UR ALBUMIN QUANTITATIVE: CPT

## 2025-09-03 ENCOUNTER — OFFICE VISIT (OUTPATIENT)
Dept: FAMILY MEDICINE | Facility: CLINIC | Age: 55
End: 2025-09-03
Payer: COMMERCIAL

## 2025-09-03 VITALS
SYSTOLIC BLOOD PRESSURE: 129 MMHG | HEIGHT: 71 IN | WEIGHT: 315 LBS | RESPIRATION RATE: 20 BRPM | BODY MASS INDEX: 44.1 KG/M2 | OXYGEN SATURATION: 98 % | DIASTOLIC BLOOD PRESSURE: 80 MMHG | TEMPERATURE: 98 F | HEART RATE: 65 BPM

## 2025-09-03 DIAGNOSIS — E03.9 HYPOTHYROIDISM, UNSPECIFIED TYPE: ICD-10-CM

## 2025-09-03 DIAGNOSIS — L84 TYPE 1 DIABETES MELLITUS WITH PRESSURE CALLUS: ICD-10-CM

## 2025-09-03 DIAGNOSIS — E10.628 TYPE 1 DIABETES MELLITUS WITH PRESSURE CALLUS: ICD-10-CM

## 2025-09-03 DIAGNOSIS — I10 PRIMARY HYPERTENSION: ICD-10-CM

## 2025-09-03 DIAGNOSIS — E66.813 CLASS 3 SEVERE OBESITY DUE TO EXCESS CALORIES WITH SERIOUS COMORBIDITY AND BODY MASS INDEX (BMI) OF 45.0 TO 49.9 IN ADULT: ICD-10-CM

## 2025-09-03 DIAGNOSIS — E78.5 HYPERLIPIDEMIA, UNSPECIFIED HYPERLIPIDEMIA TYPE: ICD-10-CM

## 2025-09-03 DIAGNOSIS — E11.65 TYPE 2 DIABETES MELLITUS WITH HYPERGLYCEMIA, WITHOUT LONG-TERM CURRENT USE OF INSULIN: Primary | ICD-10-CM

## 2025-09-03 PROCEDURE — 99215 OFFICE O/P EST HI 40 MIN: CPT | Mod: PBBFAC,PN | Performed by: NURSE PRACTITIONER

## 2025-09-03 RX ORDER — METFORMIN HYDROCHLORIDE 1000 MG/1
1000 TABLET ORAL DAILY
Qty: 90 TABLET | Refills: 1 | Status: SHIPPED | OUTPATIENT
Start: 2025-09-03 | End: 2026-03-02

## 2025-09-03 RX ORDER — LEVOTHYROXINE SODIUM 100 UG/1
100 TABLET ORAL EVERY MORNING
Qty: 90 TABLET | Refills: 1 | Status: SHIPPED | OUTPATIENT
Start: 2025-09-03 | End: 2026-03-02

## 2025-09-03 RX ORDER — INSULIN GLARGINE 100 [IU]/ML
10 INJECTION, SOLUTION SUBCUTANEOUS NIGHTLY
Qty: 9 ML | Refills: 1 | Status: SHIPPED | OUTPATIENT
Start: 2025-09-03 | End: 2026-03-02

## 2025-09-03 RX ORDER — LOSARTAN POTASSIUM 100 MG/1
100 TABLET ORAL DAILY
Qty: 90 TABLET | Refills: 1 | Status: SHIPPED | OUTPATIENT
Start: 2025-09-03 | End: 2026-03-02

## 2025-09-03 RX ORDER — LOVASTATIN 20 MG/1
20 TABLET ORAL NIGHTLY
Qty: 90 TABLET | Refills: 1 | Status: SHIPPED | OUTPATIENT
Start: 2025-09-03 | End: 2026-03-02

## 2025-09-03 RX ORDER — BLOOD-GLUCOSE SENSOR
1 EACH MISCELLANEOUS
Qty: 2 EACH | Refills: 11 | Status: SHIPPED | OUTPATIENT
Start: 2025-09-03 | End: 2026-09-03

## 2025-09-03 RX ORDER — BLOOD-GLUCOSE,RECEIVER,CONT
1 EACH MISCELLANEOUS DAILY
Qty: 1 EACH | Refills: 0 | Status: SHIPPED | OUTPATIENT
Start: 2025-09-03 | End: 2026-09-03